# Patient Record
Sex: FEMALE | Race: BLACK OR AFRICAN AMERICAN | NOT HISPANIC OR LATINO | Employment: STUDENT | URBAN - METROPOLITAN AREA
[De-identification: names, ages, dates, MRNs, and addresses within clinical notes are randomized per-mention and may not be internally consistent; named-entity substitution may affect disease eponyms.]

---

## 2017-11-16 ENCOUNTER — HOSPITAL ENCOUNTER (EMERGENCY)
Facility: HOSPITAL | Age: 22
Discharge: HOME | End: 2017-11-16
Attending: EMERGENCY MEDICINE
Payer: MEDICAID

## 2017-11-16 ENCOUNTER — TELEPHONE (OUTPATIENT)
Dept: OBSTETRICS AND GYNECOLOGY | Facility: CLINIC | Age: 22
End: 2017-11-16

## 2017-11-16 VITALS
HEART RATE: 76 BPM | RESPIRATION RATE: 16 BRPM | SYSTOLIC BLOOD PRESSURE: 118 MMHG | TEMPERATURE: 98 F | OXYGEN SATURATION: 100 % | DIASTOLIC BLOOD PRESSURE: 76 MMHG

## 2017-11-16 DIAGNOSIS — N93.9 VAGINAL BLEEDING: ICD-10-CM

## 2017-11-16 DIAGNOSIS — O20.0 THREATENED MISCARRIAGE: Primary | ICD-10-CM

## 2017-11-16 LAB
ABO + RH BLD: NORMAL
B-HCG UR QL: POSITIVE
BACTERIA #/AREA URNS HPF: ABNORMAL /HPF
BACTERIA GENITAL QL WET PREP: ABNORMAL
BASOPHILS # BLD AUTO: 0.02 K/UL
BASOPHILS NFR BLD: 0.4 %
BILIRUB UR QL STRIP: NEGATIVE
BLD GP AB SCN CELLS X3 SERPL QL: NORMAL
CLARITY UR: ABNORMAL
CLUE CELLS VAG QL WET PREP: ABNORMAL
COLOR UR: YELLOW
DIFFERENTIAL METHOD: ABNORMAL
EOSINOPHIL # BLD AUTO: 0.4 K/UL
EOSINOPHIL NFR BLD: 7.3 %
ERYTHROCYTE [DISTWIDTH] IN BLOOD BY AUTOMATED COUNT: 13 %
FILAMENT FUNGI VAG WET PREP-#/AREA: ABNORMAL
GLUCOSE UR QL STRIP: NEGATIVE
HCG INTACT+B SERPL-ACNC: NORMAL MIU/ML
HCT VFR BLD AUTO: 44.7 %
HGB BLD-MCNC: 16.1 G/DL
HGB UR QL STRIP: ABNORMAL
KETONES UR QL STRIP: NEGATIVE
LEUKOCYTE ESTERASE UR QL STRIP: ABNORMAL
LYMPHOCYTES # BLD AUTO: 2.3 K/UL
LYMPHOCYTES NFR BLD: 46.5 %
MCH RBC QN AUTO: 31.6 PG
MCHC RBC AUTO-ENTMCNC: 36 G/DL
MCV RBC AUTO: 88 FL
MICROSCOPIC COMMENT: ABNORMAL
MONOCYTES # BLD AUTO: 0.3 K/UL
MONOCYTES NFR BLD: 6.1 %
NEUTROPHILS # BLD AUTO: 2 K/UL
NEUTROPHILS NFR BLD: 39.7 %
NITRITE UR QL STRIP: NEGATIVE
PH UR STRIP: 6 [PH] (ref 5–8)
PLATELET # BLD AUTO: 184 K/UL
PMV BLD AUTO: 10.2 FL
PROT UR QL STRIP: NEGATIVE
RBC # BLD AUTO: 5.09 M/UL
RBC #/AREA URNS HPF: 2 /HPF (ref 0–4)
SP GR UR STRIP: 1 (ref 1–1.03)
SPECIMEN SOURCE: ABNORMAL
SQUAMOUS #/AREA URNS HPF: 5 /HPF
T VAGINALIS GENITAL QL WET PREP: ABNORMAL
URN SPEC COLLECT METH UR: ABNORMAL
UROBILINOGEN UR STRIP-ACNC: NEGATIVE EU/DL
WBC # BLD AUTO: 4.93 K/UL
WBC #/AREA URNS HPF: 4 /HPF (ref 0–5)
WBC #/AREA VAG WET PREP: ABNORMAL
YEAST GENITAL QL WET PREP: ABNORMAL

## 2017-11-16 PROCEDURE — 86850 RBC ANTIBODY SCREEN: CPT

## 2017-11-16 PROCEDURE — 85025 COMPLETE CBC W/AUTO DIFF WBC: CPT

## 2017-11-16 PROCEDURE — 87210 SMEAR WET MOUNT SALINE/INK: CPT

## 2017-11-16 PROCEDURE — 86900 BLOOD TYPING SEROLOGIC ABO: CPT

## 2017-11-16 PROCEDURE — 81000 URINALYSIS NONAUTO W/SCOPE: CPT

## 2017-11-16 PROCEDURE — 99284 EMERGENCY DEPT VISIT MOD MDM: CPT

## 2017-11-16 PROCEDURE — 81025 URINE PREGNANCY TEST: CPT

## 2017-11-16 PROCEDURE — 84702 CHORIONIC GONADOTROPIN TEST: CPT

## 2017-11-16 RX ORDER — ALBUTEROL SULFATE 0.63 MG/3ML
0.63 SOLUTION RESPIRATORY (INHALATION) EVERY 6 HOURS PRN
COMMUNITY

## 2017-11-16 NOTE — ED PROVIDER NOTES
Encounter Date: 11/16/2017       History     Chief Complaint   Patient presents with    Vaginal Bleeding     vaginal bleeding and cramping.  pt 10 weeks pregnant     22 year old female with complaint of vaginal bleeding X 2 days.  Reports mild cramping.  No pain radiation.  Pt is 10 weeks pregnant per u/s at [facility] weeks ago. Pt has transferred care to [facility] but has yet to establish initial visit.   No weakness or dizziness.            Review of patient's allergies indicates:   Allergen Reactions    Latex, natural rubber      Past Medical History:   Diagnosis Date    Asthma      History reviewed. No pertinent surgical history.  History reviewed. No pertinent family history.  Social History   Substance Use Topics    Smoking status: Never Smoker    Smokeless tobacco: Never Used    Alcohol use Not on file     Review of Systems   Constitutional: Negative for fever.   HENT: Negative for sore throat.    Respiratory: Negative for shortness of breath.    Cardiovascular: Negative for chest pain.   Gastrointestinal: Negative for nausea.   Genitourinary: Positive for vaginal bleeding. Negative for dysuria.   Musculoskeletal: Negative for back pain.   Skin: Negative for rash.   Neurological: Negative for weakness.   Hematological: Does not bruise/bleed easily.       Physical Exam     Initial Vitals [11/16/17 0958]   BP Pulse Resp Temp SpO2   123/77 83 18 98.1 °F (36.7 °C) 99 %      MAP       92.33         Physical Exam    Nursing note and vitals reviewed.  Constitutional: She appears well-developed and well-nourished.   HENT:   Head: Normocephalic and atraumatic.   Eyes: Conjunctivae and EOM are normal. Pupils are equal, round, and reactive to light.   Neck: Normal range of motion. Neck supple.   Cardiovascular: Normal rate, regular rhythm, normal heart sounds and intact distal pulses.   Pulmonary/Chest: Breath sounds normal.   Abdominal: Soft. There is no tenderness. There is no rebound and no guarding.    Genitourinary:   Genitourinary Comments: Mild amount of vaginal bleeding, cervical os closed,no CMT or adnexal tenderness   Musculoskeletal: Normal range of motion.   Neurological: She is alert and oriented to person, place, and time. She has normal strength and normal reflexes.   Skin: Skin is warm and dry.   Psychiatric: She has a normal mood and affect. Her behavior is normal. Thought content normal.         ED Course   Procedures  Labs Reviewed   CBC W/ AUTO DIFFERENTIAL - Abnormal; Notable for the following:        Result Value    Hemoglobin 16.1 (*)     MCH 31.6 (*)     All other components within normal limits   URINALYSIS - Abnormal; Notable for the following:     Appearance, UA Hazy (*)     Occult Blood UA 3+ (*)     Leukocytes, UA Trace (*)     All other components within normal limits   VAGINAL SCREEN - Abnormal; Notable for the following:     WBC - Vaginal Screen Occasional (*)     Bacteria - Vaginal Screen Moderate (*)     All other components within normal limits   URINALYSIS MICROSCOPIC - Abnormal; Notable for the following:     Bacteria, UA Few (*)     All other components within normal limits   PREGNANCY TEST, URINE RAPID   HCG, QUANTITATIVE, PREGNANCY   TYPE & SCREEN        10:25 AM  Bedside u/s performed, IUP noted but no fetal heart rate activity detected     Medical Decision Makin:20 PM  Suspect fetal demise but will confirm with follow up HCG with OB, discussed results with pt, pt will return for worsening bleeding, weakness or worsening pain     Labs Reviewed   CBC W/ AUTO DIFFERENTIAL - Abnormal; Notable for the following:        Result Value    Hemoglobin 16.1 (*)     MCH 31.6 (*)     All other components within normal limits   URINALYSIS - Abnormal; Notable for the following:     Appearance, UA Hazy (*)     Occult Blood UA 3+ (*)     Leukocytes, UA Trace (*)     All other components within normal limits   VAGINAL SCREEN - Abnormal; Notable for the following:     WBC - Vaginal  Screen Occasional (*)     Bacteria - Vaginal Screen Moderate (*)     All other components within normal limits   URINALYSIS MICROSCOPIC - Abnormal; Notable for the following:     Bacteria, UA Few (*)     All other components within normal limits   PREGNANCY TEST, URINE RAPID   HCG, QUANTITATIVE, PREGNANCY   TYPE & SCREEN     Imaging Results          US OB Less Than 14 Wks First Gestation (Final result)     Abnormal  Result time 11/16/17 12:02:26    Final result by Akash Dunne MD (11/16/17 12:02:26)                 Impression:        Intrauterine pregnancy without fetal heart rate or yolk sac which is most consistent with fetal demise. Correlate with beta-hCG and followup ultrasound as clinically warranted.      Electronically signed by: AKASH DUNNE MD  Date:     11/16/17  Time:    12:02              Narrative:    History: Spotting    Comparison: None.    Findings: The patient's LMP is not known).    The uterus demonstrates a gestational sac and a fetus measuring 2 cm (CRL).  This is consistent with a 8 week and 4 day fetus. No yolk sac or fetal heart rate is identified.       Right ovary measures 3.6 x 2.9 x 2.9 cm and is unremarkable.  Left ovary measures 3.1 x 2.3 x 2.3 cm and is unremarkable. Flow is seen bilaterally. Trace left adnexal fluid is seen.                                             ED Course      Clinical Impression:   The primary encounter diagnosis was Threatened miscarriage. A diagnosis of Vaginal bleeding was also pertinent to this visit.                           Lawrence Goel NP  11/16/17 1222       Lawrence Goel NP  11/16/17 1222

## 2017-11-16 NOTE — TELEPHONE ENCOUNTER
Patient manisha to [facility] ED on yesterday, ultrasound showed fetal demise.  She wants to know what should do from here.  Please advise.

## 2017-11-17 NOTE — TELEPHONE ENCOUNTER
Attempted to call patient with no answer, left detailed message to call office to schedule an appointment for evaluation.

## 2018-12-04 ENCOUNTER — INITIAL PRENATAL (OUTPATIENT)
Dept: OBSTETRICS AND GYNECOLOGY | Facility: CLINIC | Age: 23
End: 2018-12-04
Payer: MEDICAID

## 2018-12-04 ENCOUNTER — PROCEDURE VISIT (OUTPATIENT)
Dept: OBSTETRICS AND GYNECOLOGY | Facility: CLINIC | Age: 23
End: 2018-12-04
Payer: MEDICAID

## 2018-12-04 ENCOUNTER — LAB VISIT (OUTPATIENT)
Dept: LAB | Facility: HOSPITAL | Age: 23
End: 2018-12-04
Attending: MIDWIFE
Payer: MEDICAID

## 2018-12-04 VITALS — WEIGHT: 123.25 LBS | DIASTOLIC BLOOD PRESSURE: 60 MMHG | SYSTOLIC BLOOD PRESSURE: 102 MMHG

## 2018-12-04 DIAGNOSIS — Z98.891 PREVIOUS CESAREAN SECTION: ICD-10-CM

## 2018-12-04 DIAGNOSIS — R12 HEARTBURN DURING PREGNANCY IN SECOND TRIMESTER: ICD-10-CM

## 2018-12-04 DIAGNOSIS — O26.892 HEARTBURN DURING PREGNANCY IN SECOND TRIMESTER: ICD-10-CM

## 2018-12-04 DIAGNOSIS — O09.32 LATE PRENATAL CARE AFFECTING PREGNANCY IN SECOND TRIMESTER: ICD-10-CM

## 2018-12-04 DIAGNOSIS — O09.32 LATE PRENATAL CARE AFFECTING PREGNANCY IN SECOND TRIMESTER: Primary | ICD-10-CM

## 2018-12-04 LAB
ABO + RH BLD: NORMAL
BASOPHILS # BLD AUTO: 0.02 K/UL
BASOPHILS NFR BLD: 0.3 %
BLD GP AB SCN CELLS X3 SERPL QL: NORMAL
DIFFERENTIAL METHOD: ABNORMAL
EOSINOPHIL # BLD AUTO: 0.2 K/UL
EOSINOPHIL NFR BLD: 3.4 %
ERYTHROCYTE [DISTWIDTH] IN BLOOD BY AUTOMATED COUNT: 13.5 %
HCT VFR BLD AUTO: 35.3 %
HGB BLD-MCNC: 11.9 G/DL
IMM GRANULOCYTES # BLD AUTO: 0.02 K/UL
IMM GRANULOCYTES NFR BLD AUTO: 0.3 %
LYMPHOCYTES # BLD AUTO: 2.1 K/UL
LYMPHOCYTES NFR BLD: 31.6 %
MCH RBC QN AUTO: 31.4 PG
MCHC RBC AUTO-ENTMCNC: 33.7 G/DL
MCV RBC AUTO: 93 FL
MONOCYTES # BLD AUTO: 0.4 K/UL
MONOCYTES NFR BLD: 6.3 %
NEUTROPHILS # BLD AUTO: 3.8 K/UL
NEUTROPHILS NFR BLD: 58.1 %
NRBC BLD-RTO: 0 /100 WBC
PLATELET # BLD AUTO: 181 K/UL
PMV BLD AUTO: 11.6 FL
RBC # BLD AUTO: 3.79 M/UL
WBC # BLD AUTO: 6.55 K/UL

## 2018-12-04 PROCEDURE — 86592 SYPHILIS TEST NON-TREP QUAL: CPT

## 2018-12-04 PROCEDURE — 86703 HIV-1/HIV-2 1 RESULT ANTBDY: CPT

## 2018-12-04 PROCEDURE — 85025 COMPLETE CBC W/AUTO DIFF WBC: CPT

## 2018-12-04 PROCEDURE — 86850 RBC ANTIBODY SCREEN: CPT

## 2018-12-04 PROCEDURE — 87491 CHLMYD TRACH DNA AMP PROBE: CPT

## 2018-12-04 PROCEDURE — 99213 OFFICE O/P EST LOW 20 MIN: CPT | Mod: PBBFAC,TH,25 | Performed by: MIDWIFE

## 2018-12-04 PROCEDURE — 87340 HEPATITIS B SURFACE AG IA: CPT

## 2018-12-04 PROCEDURE — 86762 RUBELLA ANTIBODY: CPT

## 2018-12-04 PROCEDURE — 76805 OB US >/= 14 WKS SNGL FETUS: CPT | Mod: PBBFAC | Performed by: OBSTETRICS & GYNECOLOGY

## 2018-12-04 PROCEDURE — 99203 OFFICE O/P NEW LOW 30 MIN: CPT | Mod: TH,S$PBB,, | Performed by: MIDWIFE

## 2018-12-04 PROCEDURE — 99999 PR PBB SHADOW E&M-EST. PATIENT-LVL III: CPT | Mod: PBBFAC,,, | Performed by: MIDWIFE

## 2018-12-04 PROCEDURE — 76805 OB US >/= 14 WKS SNGL FETUS: CPT | Mod: 26,S$PBB,, | Performed by: OBSTETRICS & GYNECOLOGY

## 2018-12-04 PROCEDURE — 87086 URINE CULTURE/COLONY COUNT: CPT

## 2018-12-04 PROCEDURE — 83021 HEMOGLOBIN CHROMOTOGRAPHY: CPT

## 2018-12-04 PROCEDURE — 36415 COLL VENOUS BLD VENIPUNCTURE: CPT

## 2018-12-04 RX ORDER — MONTELUKAST SODIUM 10 MG/1
10 TABLET ORAL
Status: ON HOLD | COMMUNITY
Start: 2018-10-18 | End: 2019-03-20 | Stop reason: HOSPADM

## 2018-12-04 RX ORDER — ACETAMINOPHEN 325 MG/1
650 TABLET ORAL EVERY 6 HOURS PRN
Status: ON HOLD | COMMUNITY
End: 2019-03-20 | Stop reason: HOSPADM

## 2018-12-04 RX ORDER — PANTOPRAZOLE SODIUM 20 MG/1
20 TABLET, DELAYED RELEASE ORAL DAILY
Qty: 30 TABLET | Refills: 1 | Status: ON HOLD | OUTPATIENT
Start: 2018-12-04 | End: 2019-03-20 | Stop reason: HOSPADM

## 2018-12-04 NOTE — PROGRESS NOTES
New OB, presents for her first OB visit, states her LMP was in July, has had 2 previous  sections, the first one was for fetal distress during pushing and the second was a repeat, pt desires a PASCUAL, will get records from [facility], FH 20 cm, pt states she has + FM, prenatal labs today, pt states she is having some nausea proceeded by indigestion, rx to pharmacy, rtc 2 wks

## 2018-12-05 LAB
C TRACH DNA SPEC QL NAA+PROBE: NOT DETECTED
HBV SURFACE AG SERPL QL IA: NEGATIVE
HGB A2 MFR BLD HPLC: 3.4 %
HGB FRACT BLD ELPH-IMP: ABNORMAL
HGB FRACT BLD ELPH-IMP: NORMAL
HIV 1+2 AB+HIV1 P24 AG SERPL QL IA: NEGATIVE
N GONORRHOEA DNA SPEC QL NAA+PROBE: NOT DETECTED
RPR SER QL: NORMAL
RUBV IGG SER-ACNC: 13.5 IU/ML
RUBV IGG SER-IMP: REACTIVE

## 2018-12-06 LAB
BACTERIA UR CULT: NORMAL
BACTERIA UR CULT: NORMAL

## 2018-12-20 ENCOUNTER — ROUTINE PRENATAL (OUTPATIENT)
Dept: OBSTETRICS AND GYNECOLOGY | Facility: CLINIC | Age: 23
End: 2018-12-20
Payer: MEDICAID

## 2018-12-20 VITALS — DIASTOLIC BLOOD PRESSURE: 50 MMHG | SYSTOLIC BLOOD PRESSURE: 80 MMHG | WEIGHT: 125.88 LBS

## 2018-12-20 DIAGNOSIS — F12.10 MILD TETRAHYDROCANNABINOL (THC) ABUSE: Primary | ICD-10-CM

## 2018-12-20 DIAGNOSIS — Z3A.24 24 WEEKS GESTATION OF PREGNANCY: ICD-10-CM

## 2018-12-20 DIAGNOSIS — Z34.90 PREGNANCY, UNSPECIFIED GESTATIONAL AGE: ICD-10-CM

## 2018-12-20 LAB
AMPHET+METHAMPHET UR QL: NEGATIVE
BARBITURATES UR QL SCN>200 NG/ML: NEGATIVE
BENZODIAZ UR QL SCN>200 NG/ML: NEGATIVE
BZE UR QL SCN: NEGATIVE
CANNABINOIDS UR QL SCN: NORMAL
CREAT UR-MCNC: 42 MG/DL
METHADONE UR QL SCN>300 NG/ML: NEGATIVE
OPIATES UR QL SCN: NEGATIVE
PCP UR QL SCN>25 NG/ML: NEGATIVE
TOXICOLOGY INFORMATION: NORMAL

## 2018-12-20 PROCEDURE — 99999 PR PBB SHADOW E&M-EST. PATIENT-LVL II: CPT | Mod: PBBFAC,,, | Performed by: MIDWIFE

## 2018-12-20 PROCEDURE — 99212 OFFICE O/P EST SF 10 MIN: CPT | Mod: PBBFAC,TH | Performed by: MIDWIFE

## 2018-12-20 PROCEDURE — 80307 DRUG TEST PRSMV CHEM ANLYZR: CPT

## 2018-12-20 PROCEDURE — 99212 OFFICE O/P EST SF 10 MIN: CPT | Mod: TH,S$PBB,, | Performed by: MIDWIFE

## 2018-12-20 NOTE — PROGRESS NOTES
Doing well overall. No complaints.  Reviewed PTL s/s.  28 week glucose screen at [place].  UDS today.  RTC in 4 weeks.    FABY Sawant

## 2018-12-21 PROBLEM — Z3A.24 24 WEEKS GESTATION OF PREGNANCY: Status: RESOLVED | Noted: 2018-12-20 | Resolved: 2018-12-21

## 2018-12-21 PROBLEM — F12.10 MILD TETRAHYDROCANNABINOL (THC) ABUSE: Status: ACTIVE | Noted: 2018-12-21

## 2019-01-04 ENCOUNTER — TELEPHONE (OUTPATIENT)
Dept: OBSTETRICS AND GYNECOLOGY | Facility: CLINIC | Age: 24
End: 2019-01-04

## 2019-01-04 NOTE — TELEPHONE ENCOUNTER
----- Message from Pilar Youngblood sent at 1/4/2019  4:50 PM CST -----  Contact: self [phone]  States that she is calling to see what she can take for nasal congestion. Please call back at [phone]//thank you acc

## 2019-01-04 NOTE — TELEPHONE ENCOUNTER
Spoke to patient and let her know that for congestion she can use Ocean Nasal Spray, a saline nasal spray, or sudafed sparingly. Informed patient not to use antihistamines because they may make the congestion worse. You can also try a humidifier. Let patient know if none of these work to let us know. Patient verbalized understanding.

## 2019-01-07 ENCOUNTER — TELEPHONE (OUTPATIENT)
Dept: OBSTETRICS AND GYNECOLOGY | Facility: CLINIC | Age: 24
End: 2019-01-07

## 2019-01-07 NOTE — TELEPHONE ENCOUNTER
----- Message from Juan Robbins sent at 1/7/2019  1:38 PM CST -----  Contact: pt   Pt would like cb to discuss getting script for anxiety. pls return call.           ..[phone] (home)

## 2019-01-07 NOTE — TELEPHONE ENCOUNTER
"Patient stated she is pacing and not able to function around others and her other children.  Patient denies any thoughts of harming herself or her children, just stated "I want to isolate myself in my room and I can do that because I have other children".  "I can't function to pay bills, go grocery shopping, etc.".  Appointment scheduled tomorrow with [facility].  Patient verbalized understanding.  "

## 2019-01-23 ENCOUNTER — LAB VISIT (OUTPATIENT)
Dept: LAB | Facility: HOSPITAL | Age: 24
End: 2019-01-23
Payer: MEDICAID

## 2019-01-23 ENCOUNTER — ROUTINE PRENATAL (OUTPATIENT)
Dept: OBSTETRICS AND GYNECOLOGY | Facility: CLINIC | Age: 24
End: 2019-01-23
Payer: MEDICAID

## 2019-01-23 VITALS
SYSTOLIC BLOOD PRESSURE: 98 MMHG | WEIGHT: 131.38 LBS | HEIGHT: 63 IN | DIASTOLIC BLOOD PRESSURE: 60 MMHG | BODY MASS INDEX: 23.28 KG/M2

## 2019-01-23 DIAGNOSIS — Z34.90 PREGNANCY, UNSPECIFIED GESTATIONAL AGE: ICD-10-CM

## 2019-01-23 DIAGNOSIS — Z98.891 PREVIOUS CESAREAN SECTION: Primary | ICD-10-CM

## 2019-01-23 LAB — GLUCOSE SERPL-MCNC: 98 MG/DL

## 2019-01-23 PROCEDURE — 99213 PR OFFICE/OUTPT VISIT, EST, LEVL III, 20-29 MIN: ICD-10-PCS | Mod: TH,S$PBB,, | Performed by: ADVANCED PRACTICE MIDWIFE

## 2019-01-23 PROCEDURE — 99999 PR PBB SHADOW E&M-EST. PATIENT-LVL III: CPT | Mod: PBBFAC,,, | Performed by: ADVANCED PRACTICE MIDWIFE

## 2019-01-23 PROCEDURE — 99213 OFFICE O/P EST LOW 20 MIN: CPT | Mod: PBBFAC | Performed by: ADVANCED PRACTICE MIDWIFE

## 2019-01-23 PROCEDURE — 82950 GLUCOSE TEST: CPT

## 2019-01-23 PROCEDURE — 99999 PR PBB SHADOW E&M-EST. PATIENT-LVL III: ICD-10-PCS | Mod: PBBFAC,,, | Performed by: ADVANCED PRACTICE MIDWIFE

## 2019-01-23 PROCEDURE — 36415 COLL VENOUS BLD VENIPUNCTURE: CPT

## 2019-01-23 PROCEDURE — 99213 OFFICE O/P EST LOW 20 MIN: CPT | Mod: TH,S$PBB,, | Performed by: ADVANCED PRACTICE MIDWIFE

## 2019-01-23 RX ORDER — FLUTICASONE PROPIONATE 50 MCG
2 SPRAY, SUSPENSION (ML) NASAL
COMMUNITY
Start: 2019-01-06 | End: 2019-11-25

## 2019-01-23 RX ORDER — ALBUTEROL SULFATE 0.63 MG/3ML
0.63 SOLUTION RESPIRATORY (INHALATION) EVERY 6 HOURS PRN
COMMUNITY
End: 2019-01-23 | Stop reason: SDUPTHER

## 2019-01-23 RX ORDER — HYDROXYZINE PAMOATE 25 MG/1
25 CAPSULE ORAL EVERY 6 HOURS PRN
Qty: 30 CAPSULE | Refills: 2 | Status: ON HOLD | OUTPATIENT
Start: 2019-01-23 | End: 2019-03-20 | Stop reason: HOSPADM

## 2019-01-23 NOTE — PROGRESS NOTES
"C/o pelvic pain, more in RLQ, exp round ligament pain, scar tissue, comfort measures. SOB with exertion and talking while walking, pt states this is "common" for her, + asthma, using inhaler. Exp if s/s worsen or dizziness to ED. Reviewed hx with pt, desires TOLAC, op note in media, #1 c/s was done for arrest of labor, second stage, failed vac ext, pt states she was exhausted, did not push well. #2 pt was told baby larger than first, odds against vaginal birth so went with repeat c/s. Exp r/b/a, safety measures in place. Restless legs at night- rec magnesium supplement. Was depressed and overwhelmed a few weeks ago, symptoms have improved, started a job at [employer] last week,  supportive. rx vistaril for sleep aid and anxious feelings. tdap handout provided and exp, will do next OV. Labs today. Pt desires BTL exp permanency, r/b/a, consent signed. al  "

## 2019-02-06 ENCOUNTER — TELEPHONE (OUTPATIENT)
Dept: OBSTETRICS AND GYNECOLOGY | Facility: CLINIC | Age: 24
End: 2019-02-06

## 2019-02-06 ENCOUNTER — ROUTINE PRENATAL (OUTPATIENT)
Dept: OBSTETRICS AND GYNECOLOGY | Facility: CLINIC | Age: 24
End: 2019-02-06
Payer: MEDICAID

## 2019-02-06 VITALS — DIASTOLIC BLOOD PRESSURE: 60 MMHG | WEIGHT: 134.5 LBS | BODY MASS INDEX: 23.82 KG/M2 | SYSTOLIC BLOOD PRESSURE: 102 MMHG

## 2019-02-06 DIAGNOSIS — Z34.80 ENCOUNTER FOR SUPERVISION OF NORMAL PREGNANCY IN MULTIGRAVIDA: ICD-10-CM

## 2019-02-06 DIAGNOSIS — O26.849 UTERINE SIZE DATE DISCREPANCY PREGNANCY: ICD-10-CM

## 2019-02-06 DIAGNOSIS — M54.50 ACUTE MIDLINE LOW BACK PAIN WITHOUT SCIATICA: ICD-10-CM

## 2019-02-06 DIAGNOSIS — Z98.891 PREVIOUS CESAREAN SECTION: Primary | ICD-10-CM

## 2019-02-06 PROCEDURE — 99213 OFFICE O/P EST LOW 20 MIN: CPT | Mod: TH,S$PBB,, | Performed by: ADVANCED PRACTICE MIDWIFE

## 2019-02-06 PROCEDURE — 99999 PR PBB SHADOW E&M-EST. PATIENT-LVL II: ICD-10-PCS | Mod: PBBFAC,,, | Performed by: ADVANCED PRACTICE MIDWIFE

## 2019-02-06 PROCEDURE — 90471 IMMUNIZATION ADMIN: CPT | Mod: PBBFAC

## 2019-02-06 PROCEDURE — 99212 OFFICE O/P EST SF 10 MIN: CPT | Mod: PBBFAC,TH | Performed by: ADVANCED PRACTICE MIDWIFE

## 2019-02-06 PROCEDURE — 99999 PR PBB SHADOW E&M-EST. PATIENT-LVL II: CPT | Mod: PBBFAC,,, | Performed by: ADVANCED PRACTICE MIDWIFE

## 2019-02-06 PROCEDURE — 99213 PR OFFICE/OUTPT VISIT, EST, LEVL III, 20-29 MIN: ICD-10-PCS | Mod: TH,S$PBB,, | Performed by: ADVANCED PRACTICE MIDWIFE

## 2019-02-06 RX ORDER — PROMETHAZINE HYDROCHLORIDE 6.25 MG/5ML
12.5 SYRUP ORAL 4 TIMES DAILY PRN
COMMUNITY
Start: 2019-02-01 | End: 2019-02-08

## 2019-02-06 RX ORDER — METHOCARBAMOL 500 MG/1
500 TABLET, FILM COATED ORAL 3 TIMES DAILY
Qty: 30 TABLET | Refills: 0 | Status: SHIPPED | OUTPATIENT
Start: 2019-02-06 | End: 2019-02-16

## 2019-02-06 NOTE — PROGRESS NOTES
Tdap given IM to left deltoid.  Pt tolerated well.  Pt advised to wait 10-15 minutes for s/s of medication reaction.  Appt made for next visit on 2/19/19 at 1pm at [place] location, per pt request.  Pt voiced understanding.  JONATHAN VIVAR

## 2019-02-06 NOTE — PROGRESS NOTES
Reports good FM.  Here today because had to leave work due to back pain.  States pain lower middle back.  Denies contractions or loss of fluid, or UTI S/S.  Advised she is very small and that lower back pain common in third trimester.  Advised to purcahse a maternity belt, and to alternate cold packs with heating pad.   Warm soaks PRN.   Rx for Robaxin sent to pharmacy.  Declines PT @ present.  S<D US with RTC 2 weeks to assess EFW.  TDAP today.  FKC's and S/S PTL reviewed

## 2019-02-06 NOTE — TELEPHONE ENCOUNTER
Having pain x 930pm last night, want to be seen,  appt made for today at 940 at [facility],  Patient said that she called medical to Cx her daughter mediaid insurance but the Cx her's,  She said they did have another insurance pending, she will let us know tf

## 2019-02-06 NOTE — LETTER
February 6, 2019      [facility] - [facility]  [address] [facility] [facility] [address]  Phone: [phone]  Fax: [phone]       Patient: Nena Nascimento   YOB: 1995  Date of Visit: 02/06/2019    To Whom It May Concern:    Arnulfo Nascimento  was at [facility] on 02/06/2019. She may return to work/school on 2/7/19 with lifting restrictions of no more than 20#.  If you have any questions or concerns, or if I can be of further assistance, please do not hesitate to contact me.    Sincerely,        Siomara Calvo CNM

## 2019-02-19 ENCOUNTER — PROCEDURE VISIT (OUTPATIENT)
Dept: OBSTETRICS AND GYNECOLOGY | Facility: CLINIC | Age: 24
End: 2019-02-19
Payer: MEDICAID

## 2019-02-19 ENCOUNTER — ROUTINE PRENATAL (OUTPATIENT)
Dept: OBSTETRICS AND GYNECOLOGY | Facility: CLINIC | Age: 24
End: 2019-02-19
Payer: MEDICAID

## 2019-02-19 VITALS
BODY MASS INDEX: 24.41 KG/M2 | WEIGHT: 137.81 LBS | SYSTOLIC BLOOD PRESSURE: 100 MMHG | DIASTOLIC BLOOD PRESSURE: 60 MMHG

## 2019-02-19 DIAGNOSIS — Z98.891 PREVIOUS CESAREAN SECTION: Primary | ICD-10-CM

## 2019-02-19 DIAGNOSIS — O26.849 UTERINE SIZE DATE DISCREPANCY PREGNANCY: ICD-10-CM

## 2019-02-19 DIAGNOSIS — Z34.80 ENCOUNTER FOR SUPERVISION OF NORMAL PREGNANCY IN MULTIGRAVIDA: ICD-10-CM

## 2019-02-19 PROCEDURE — 99213 OFFICE O/P EST LOW 20 MIN: CPT | Mod: PBBFAC,TH | Performed by: ADVANCED PRACTICE MIDWIFE

## 2019-02-19 PROCEDURE — 99999 PR PBB SHADOW E&M-EST. PATIENT-LVL III: ICD-10-PCS | Mod: PBBFAC,,, | Performed by: ADVANCED PRACTICE MIDWIFE

## 2019-02-19 PROCEDURE — 76819 PR US, OB, FETAL BIOPHYSICAL, W/O NST: ICD-10-PCS | Mod: 26,S$PBB,, | Performed by: OBSTETRICS & GYNECOLOGY

## 2019-02-19 PROCEDURE — 76816 OB US FOLLOW-UP PER FETUS: CPT | Mod: PBBFAC | Performed by: OBSTETRICS & GYNECOLOGY

## 2019-02-19 PROCEDURE — 99213 PR OFFICE/OUTPT VISIT, EST, LEVL III, 20-29 MIN: ICD-10-PCS | Mod: TH,S$PBB,, | Performed by: ADVANCED PRACTICE MIDWIFE

## 2019-02-19 PROCEDURE — 99213 OFFICE O/P EST LOW 20 MIN: CPT | Mod: TH,S$PBB,, | Performed by: ADVANCED PRACTICE MIDWIFE

## 2019-02-19 PROCEDURE — 76816 PR  US,PREGNANT UTERUS,F/U,TRANSABD APP: ICD-10-PCS | Mod: 26,S$PBB,, | Performed by: OBSTETRICS & GYNECOLOGY

## 2019-02-19 PROCEDURE — 76819 FETAL BIOPHYS PROFIL W/O NST: CPT | Mod: PBBFAC | Performed by: OBSTETRICS & GYNECOLOGY

## 2019-02-19 PROCEDURE — 99999 PR PBB SHADOW E&M-EST. PATIENT-LVL III: CPT | Mod: PBBFAC,,, | Performed by: ADVANCED PRACTICE MIDWIFE

## 2019-02-19 PROCEDURE — 76819 FETAL BIOPHYS PROFIL W/O NST: CPT | Mod: 26,S$PBB,, | Performed by: OBSTETRICS & GYNECOLOGY

## 2019-02-19 PROCEDURE — 76816 OB US FOLLOW-UP PER FETUS: CPT | Mod: 26,S$PBB,, | Performed by: OBSTETRICS & GYNECOLOGY

## 2019-02-19 NOTE — PROGRESS NOTES
Doing well, US today EFW 4#8oz, 27%. Denies PTL s/s, states good FM. Questions concerning breastfeeding discussed.   Quiet hour discussed  Coffective counseling sheet Learn Your Baby discussed with mother. Instructed regarding feeding cues and methods to calm baby. Encouraged mother to download Coffective mobile carla if she has not already done so.  Mother verbalized understanding.   Coffective counseling sheet Keep Baby Close discussed with mother. Reinforced rooming in practices, continued skin to skin, and quiet hours as requested by mother.  Encouraged mother to download Coffective mobile carla if she has not already done so. Mother verbalizes understanding. al

## 2019-02-26 ENCOUNTER — TELEPHONE (OUTPATIENT)
Dept: OBSTETRICS AND GYNECOLOGY | Facility: CLINIC | Age: 24
End: 2019-02-26

## 2019-02-26 NOTE — TELEPHONE ENCOUNTER
I only saw her on the last visit and there were no complaints. During pregnancy we recommend them to continue working as long as possible if health permits. She can discuss this with her provider at her next appointment. Pilar

## 2019-02-26 NOTE — TELEPHONE ENCOUNTER
Patient states that her work needs a letter saying when she is to stop working for the duration of the pregnancy. Patient stated that she is to stop at 36 weeks.  Patient states that the reasoning is that her job requires standing, and that it is hard for her stand this far along in her pregnancy.  Please advise on if patient is to stop work.

## 2019-02-26 NOTE — TELEPHONE ENCOUNTER
Called and informed patient that SCOTTY Quezada stated that patient has to wait until next appt which is next week 3/8/2019 to speak about the letter that the patient is requesting for work leave. Patient stated that standing for long period of time is hard for her in this stage of her pregnancy.  SCOTTY Quezada stated that patient did not mention any issues with work in regards to her not being able to stand on her feet for long periods of time.  Relayed this information to the patient, and she verbalized understanding.

## 2019-02-26 NOTE — TELEPHONE ENCOUNTER
----- Message from Annemarie Meza sent at 2/26/2019  9:39 AM CST -----  Contact: Patient  Type:  Needs Medical Advice    Who Called: Nena  Symptoms (please be specific): n/a  How long has patient had these symptoms:  n/a  Pharmacy name and phone #:  n/a  Would the patient rather a call back or a response via [facility]?  Call back  Best Call Back Number: [phone]  Additional Information: The patient needs a letter that explains when she is supposed to start working; she needs it for work right away.

## 2019-02-26 NOTE — TELEPHONE ENCOUNTER
----- Message from Norah Cerda sent at 2/26/2019 10:49 AM CST -----  Contact: pt   States she's calling rg the letter needs to be emailed. States they don't have a fax and the email address is Jose Mjuan [email or web address] and pt can be reached at [phone]//thanks/dbw

## 2019-03-08 ENCOUNTER — ROUTINE PRENATAL (OUTPATIENT)
Dept: OBSTETRICS AND GYNECOLOGY | Facility: CLINIC | Age: 24
End: 2019-03-08
Payer: MEDICAID

## 2019-03-08 VITALS
WEIGHT: 141.75 LBS | BODY MASS INDEX: 25.11 KG/M2 | SYSTOLIC BLOOD PRESSURE: 116 MMHG | DIASTOLIC BLOOD PRESSURE: 64 MMHG

## 2019-03-08 DIAGNOSIS — Z98.891 PREVIOUS CESAREAN SECTION: ICD-10-CM

## 2019-03-08 DIAGNOSIS — Z34.80 ENCOUNTER FOR SUPERVISION OF NORMAL PREGNANCY IN MULTIGRAVIDA: Primary | ICD-10-CM

## 2019-03-08 PROCEDURE — 99213 OFFICE O/P EST LOW 20 MIN: CPT | Mod: TH,S$PBB,, | Performed by: ADVANCED PRACTICE MIDWIFE

## 2019-03-08 PROCEDURE — 99213 OFFICE O/P EST LOW 20 MIN: CPT | Mod: PBBFAC,TH | Performed by: ADVANCED PRACTICE MIDWIFE

## 2019-03-08 PROCEDURE — 87081 CULTURE SCREEN ONLY: CPT

## 2019-03-08 PROCEDURE — 99999 PR PBB SHADOW E&M-EST. PATIENT-LVL III: CPT | Mod: PBBFAC,,, | Performed by: ADVANCED PRACTICE MIDWIFE

## 2019-03-08 PROCEDURE — 99213 PR OFFICE/OUTPT VISIT, EST, LEVL III, 20-29 MIN: ICD-10-PCS | Mod: TH,S$PBB,, | Performed by: ADVANCED PRACTICE MIDWIFE

## 2019-03-08 PROCEDURE — 99999 PR PBB SHADOW E&M-EST. PATIENT-LVL III: ICD-10-PCS | Mod: PBBFAC,,, | Performed by: ADVANCED PRACTICE MIDWIFE

## 2019-03-08 NOTE — PROGRESS NOTES
Doing well, no c/o. Needs note documenting she CAN work without restrictions, working at [employer], note provided. GBS collected today. Denies PTL s/s. Still desires TOLAC X 2, exp will sched with MD for final approval. UDS next OV secondary to THC use in the past and planning to breastfeed. Pt aware. al

## 2019-03-11 LAB — BACTERIA SPEC AEROBE CULT: NORMAL

## 2019-03-17 ENCOUNTER — HOSPITAL ENCOUNTER (INPATIENT)
Facility: HOSPITAL | Age: 24
LOS: 3 days | Discharge: HOME | End: 2019-03-20
Attending: OBSTETRICS & GYNECOLOGY | Admitting: OBSTETRICS & GYNECOLOGY
Payer: MEDICAID

## 2019-03-17 ENCOUNTER — ANESTHESIA EVENT (OUTPATIENT)
Dept: OBSTETRICS AND GYNECOLOGY | Facility: HOSPITAL | Age: 24
End: 2019-03-17
Payer: MEDICAID

## 2019-03-17 ENCOUNTER — ANESTHESIA (OUTPATIENT)
Dept: OBSTETRICS AND GYNECOLOGY | Facility: HOSPITAL | Age: 24
End: 2019-03-17
Payer: MEDICAID

## 2019-03-17 DIAGNOSIS — J10.1 INFLUENZA A: Primary | ICD-10-CM

## 2019-03-17 DIAGNOSIS — Z98.891 S/P CESAREAN SECTION: ICD-10-CM

## 2019-03-17 DIAGNOSIS — O42.90 AMNIOTIC FLUID LEAKING: ICD-10-CM

## 2019-03-17 PROBLEM — J45.909 ASTHMA: Status: ACTIVE | Noted: 2019-03-17

## 2019-03-17 PROBLEM — O43.193: Status: ACTIVE | Noted: 2019-03-17

## 2019-03-17 PROBLEM — O41.1230 CHORIOAMNIONITIS IN THIRD TRIMESTER: Status: ACTIVE | Noted: 2019-03-17

## 2019-03-17 LAB
ABO + RH BLD: NORMAL
AMPHET+METHAMPHET UR QL: NEGATIVE
BARBITURATES UR QL SCN>200 NG/ML: NEGATIVE
BASOPHILS # BLD AUTO: 0.01 K/UL
BASOPHILS NFR BLD: 0.1 %
BENZODIAZ UR QL SCN>200 NG/ML: NEGATIVE
BLD GP AB SCN CELLS X3 SERPL QL: NORMAL
BZE UR QL SCN: NEGATIVE
CANNABINOIDS UR QL SCN: NORMAL
CREAT UR-MCNC: 75.2 MG/DL
DIFFERENTIAL METHOD: ABNORMAL
EOSINOPHIL # BLD AUTO: 0 K/UL
EOSINOPHIL NFR BLD: 0.1 %
ERYTHROCYTE [DISTWIDTH] IN BLOOD BY AUTOMATED COUNT: 13.3 %
HCT VFR BLD AUTO: 31.6 %
HGB BLD-MCNC: 10.8 G/DL
HIV1+2 IGG SERPL QL IA.RAPID: NEGATIVE
INFLUENZA A, MOLECULAR: POSITIVE
INFLUENZA B, MOLECULAR: NEGATIVE
LYMPHOCYTES # BLD AUTO: 0.4 K/UL
LYMPHOCYTES NFR BLD: 3.6 %
MCH RBC QN AUTO: 31 PG
MCHC RBC AUTO-ENTMCNC: 34.2 G/DL
MCV RBC AUTO: 91 FL
METHADONE UR QL SCN>300 NG/ML: NEGATIVE
MONOCYTES # BLD AUTO: 0.7 K/UL
MONOCYTES NFR BLD: 6 %
NEUTROPHILS # BLD AUTO: 10.4 K/UL
NEUTROPHILS NFR BLD: 90.2 %
OPIATES UR QL SCN: NEGATIVE
PCP UR QL SCN>25 NG/ML: NEGATIVE
PLATELET # BLD AUTO: 158 K/UL
PMV BLD AUTO: 11.3 FL
RBC # BLD AUTO: 3.48 M/UL
RPR SER QL: NORMAL
SPECIMEN SOURCE: ABNORMAL
TOXICOLOGY INFORMATION: NORMAL
WBC # BLD AUTO: 11.54 K/UL

## 2019-03-17 PROCEDURE — 63600175 PHARM REV CODE 636 W HCPCS: Performed by: NURSE ANESTHETIST, CERTIFIED REGISTERED

## 2019-03-17 PROCEDURE — 62322 NJX INTERLAMINAR LMBR/SAC: CPT | Performed by: STUDENT IN AN ORGANIZED HEALTH CARE EDUCATION/TRAINING PROGRAM

## 2019-03-17 PROCEDURE — 37000009 HC ANESTHESIA EA ADD 15 MINS: Performed by: OBSTETRICS & GYNECOLOGY

## 2019-03-17 PROCEDURE — 80307 DRUG TEST PRSMV CHEM ANLYZR: CPT

## 2019-03-17 PROCEDURE — S0077 INJECTION, CLINDAMYCIN PHOSP: HCPCS | Performed by: OBSTETRICS & GYNECOLOGY

## 2019-03-17 PROCEDURE — 63600175 PHARM REV CODE 636 W HCPCS: Performed by: ADVANCED PRACTICE MIDWIFE

## 2019-03-17 PROCEDURE — 72100002 HC LABOR CARE, 1ST 8 HOURS

## 2019-03-17 PROCEDURE — 25000003 PHARM REV CODE 250: Performed by: ADVANCED PRACTICE MIDWIFE

## 2019-03-17 PROCEDURE — 72100003 HC LABOR CARE, EA. ADDL. 8 HRS

## 2019-03-17 PROCEDURE — 11000001 HC ACUTE MED/SURG PRIVATE ROOM

## 2019-03-17 PROCEDURE — 86703 HIV-1/HIV-2 1 RESULT ANTBDY: CPT

## 2019-03-17 PROCEDURE — 85025 COMPLETE CBC W/AUTO DIFF WBC: CPT

## 2019-03-17 PROCEDURE — 94640 AIRWAY INHALATION TREATMENT: CPT

## 2019-03-17 PROCEDURE — 25000003 PHARM REV CODE 250: Performed by: OBSTETRICS & GYNECOLOGY

## 2019-03-17 PROCEDURE — 59620 PR C-SEC ONLY,PREV C-SEC: ICD-10-PCS | Mod: AT,,, | Performed by: OBSTETRICS & GYNECOLOGY

## 2019-03-17 PROCEDURE — 25000242 PHARM REV CODE 250 ALT 637 W/ HCPCS: Performed by: ADVANCED PRACTICE MIDWIFE

## 2019-03-17 PROCEDURE — 36000684 HC CESAREAN SECTION, UNSCHEDULED

## 2019-03-17 PROCEDURE — 86592 SYPHILIS TEST NON-TREP QUAL: CPT

## 2019-03-17 PROCEDURE — 37000008 HC ANESTHESIA 1ST 15 MINUTES: Performed by: OBSTETRICS & GYNECOLOGY

## 2019-03-17 PROCEDURE — 63600175 PHARM REV CODE 636 W HCPCS: Performed by: OBSTETRICS & GYNECOLOGY

## 2019-03-17 PROCEDURE — 51702 INSERT TEMP BLADDER CATH: CPT

## 2019-03-17 PROCEDURE — 87502 INFLUENZA DNA AMP PROBE: CPT

## 2019-03-17 PROCEDURE — 86850 RBC ANTIBODY SCREEN: CPT

## 2019-03-17 RX ORDER — SODIUM CHLORIDE 9 MG/ML
INJECTION, SOLUTION INTRAVENOUS
Status: DISCONTINUED | OUTPATIENT
Start: 2019-03-17 | End: 2019-03-17

## 2019-03-17 RX ORDER — HYDROCODONE BITARTRATE AND ACETAMINOPHEN 10; 325 MG/1; MG/1
1 TABLET ORAL EVERY 4 HOURS PRN
Status: DISCONTINUED | OUTPATIENT
Start: 2019-03-17 | End: 2019-03-17

## 2019-03-17 RX ORDER — SODIUM CITRATE AND CITRIC ACID MONOHYDRATE 334; 500 MG/5ML; MG/5ML
30 SOLUTION ORAL
Status: DISCONTINUED | OUTPATIENT
Start: 2019-03-17 | End: 2019-03-17

## 2019-03-17 RX ORDER — MORPHINE SULFATE 1 MG/ML
INJECTION, SOLUTION EPIDURAL; INTRATHECAL; INTRAVENOUS
Status: DISCONTINUED | OUTPATIENT
Start: 2019-03-17 | End: 2019-03-17

## 2019-03-17 RX ORDER — HYDROCODONE BITARTRATE AND ACETAMINOPHEN 5; 325 MG/1; MG/1
1 TABLET ORAL EVERY 4 HOURS PRN
Status: DISCONTINUED | OUTPATIENT
Start: 2019-03-17 | End: 2019-03-17

## 2019-03-17 RX ORDER — OXYTOCIN/RINGER'S LACTATE 20/1000 ML
41.65 PLASTIC BAG, INJECTION (ML) INTRAVENOUS CONTINUOUS
Status: DISPENSED | OUTPATIENT
Start: 2019-03-17 | End: 2019-03-18

## 2019-03-17 RX ORDER — SODIUM CHLORIDE, SODIUM LACTATE, POTASSIUM CHLORIDE, CALCIUM CHLORIDE 600; 310; 30; 20 MG/100ML; MG/100ML; MG/100ML; MG/100ML
INJECTION, SOLUTION INTRAVENOUS CONTINUOUS
Status: ACTIVE | OUTPATIENT
Start: 2019-03-17 | End: 2019-03-18

## 2019-03-17 RX ORDER — ACETAMINOPHEN 500 MG
1000 TABLET ORAL
Status: DISCONTINUED | OUTPATIENT
Start: 2019-03-18 | End: 2019-03-20 | Stop reason: HOSPADM

## 2019-03-17 RX ORDER — OSELTAMIVIR PHOSPHATE 75 MG/1
75 CAPSULE ORAL 2 TIMES DAILY
Status: DISCONTINUED | OUTPATIENT
Start: 2019-03-17 | End: 2019-03-20 | Stop reason: HOSPADM

## 2019-03-17 RX ORDER — KETOROLAC TROMETHAMINE 30 MG/ML
INJECTION, SOLUTION INTRAMUSCULAR; INTRAVENOUS
Status: DISCONTINUED | OUTPATIENT
Start: 2019-03-17 | End: 2019-03-17

## 2019-03-17 RX ORDER — IBUPROFEN 600 MG/1
600 TABLET ORAL EVERY 6 HOURS
Status: DISCONTINUED | OUTPATIENT
Start: 2019-03-18 | End: 2019-03-20 | Stop reason: HOSPADM

## 2019-03-17 RX ORDER — DIPHENHYDRAMINE HCL 25 MG
25 CAPSULE ORAL EVERY 4 HOURS PRN
Status: DISCONTINUED | OUTPATIENT
Start: 2019-03-17 | End: 2019-03-20 | Stop reason: HOSPADM

## 2019-03-17 RX ORDER — ACETAMINOPHEN 500 MG
1000 TABLET ORAL EVERY 6 HOURS PRN
Status: DISCONTINUED | OUTPATIENT
Start: 2019-03-17 | End: 2019-03-17

## 2019-03-17 RX ORDER — SIMETHICONE 80 MG
1 TABLET,CHEWABLE ORAL EVERY 6 HOURS PRN
Status: DISCONTINUED | OUTPATIENT
Start: 2019-03-17 | End: 2019-03-20 | Stop reason: HOSPADM

## 2019-03-17 RX ORDER — ACETAMINOPHEN 500 MG
1000 TABLET ORAL ONCE
Status: DISCONTINUED | OUTPATIENT
Start: 2019-03-17 | End: 2019-03-17

## 2019-03-17 RX ORDER — OXYTOCIN/RINGER'S LACTATE 20/1000 ML
2 PLASTIC BAG, INJECTION (ML) INTRAVENOUS CONTINUOUS
Status: DISCONTINUED | OUTPATIENT
Start: 2019-03-17 | End: 2019-03-17

## 2019-03-17 RX ORDER — MISOPROSTOL 200 UG/1
800 TABLET ORAL
Status: DISCONTINUED | OUTPATIENT
Start: 2019-03-17 | End: 2019-03-17

## 2019-03-17 RX ORDER — HYDROCODONE BITARTRATE AND ACETAMINOPHEN 5; 325 MG/1; MG/1
1 TABLET ORAL EVERY 6 HOURS PRN
Status: DISCONTINUED | OUTPATIENT
Start: 2019-03-17 | End: 2019-03-19

## 2019-03-17 RX ORDER — ONDANSETRON 2 MG/ML
INJECTION INTRAMUSCULAR; INTRAVENOUS
Status: DISCONTINUED | OUTPATIENT
Start: 2019-03-17 | End: 2019-03-17

## 2019-03-17 RX ORDER — MISOPROSTOL 200 UG/1
600 TABLET ORAL
Status: DISCONTINUED | OUTPATIENT
Start: 2019-03-17 | End: 2019-03-20 | Stop reason: HOSPADM

## 2019-03-17 RX ORDER — CHLORHEXIDINE GLUCONATE ORAL RINSE 1.2 MG/ML
10 SOLUTION DENTAL
Status: DISCONTINUED | OUTPATIENT
Start: 2019-03-17 | End: 2019-03-17

## 2019-03-17 RX ORDER — DOCUSATE SODIUM 100 MG/1
200 CAPSULE, LIQUID FILLED ORAL 2 TIMES DAILY PRN
Status: DISCONTINUED | OUTPATIENT
Start: 2019-03-17 | End: 2019-03-20 | Stop reason: HOSPADM

## 2019-03-17 RX ORDER — HYDROMORPHONE HYDROCHLORIDE 1 MG/ML
1 INJECTION, SOLUTION INTRAMUSCULAR; INTRAVENOUS; SUBCUTANEOUS EVERY 4 HOURS PRN
Status: DISPENSED | OUTPATIENT
Start: 2019-03-17 | End: 2019-03-18

## 2019-03-17 RX ORDER — IPRATROPIUM BROMIDE AND ALBUTEROL SULFATE 2.5; .5 MG/3ML; MG/3ML
3 SOLUTION RESPIRATORY (INHALATION) EVERY 6 HOURS PRN
Status: DISCONTINUED | OUTPATIENT
Start: 2019-03-17 | End: 2019-03-17

## 2019-03-17 RX ORDER — SODIUM CHLORIDE, SODIUM LACTATE, POTASSIUM CHLORIDE, CALCIUM CHLORIDE 600; 310; 30; 20 MG/100ML; MG/100ML; MG/100ML; MG/100ML
INJECTION, SOLUTION INTRAVENOUS CONTINUOUS
Status: DISCONTINUED | OUTPATIENT
Start: 2019-03-17 | End: 2019-03-17

## 2019-03-17 RX ORDER — PANTOPRAZOLE SODIUM 40 MG/1
20 TABLET, DELAYED RELEASE ORAL DAILY
Status: DISCONTINUED | OUTPATIENT
Start: 2019-03-18 | End: 2019-03-20 | Stop reason: HOSPADM

## 2019-03-17 RX ORDER — HYDROCORTISONE 25 MG/G
CREAM TOPICAL 3 TIMES DAILY PRN
Status: DISCONTINUED | OUTPATIENT
Start: 2019-03-17 | End: 2019-03-20 | Stop reason: HOSPADM

## 2019-03-17 RX ORDER — HYDROCODONE BITARTRATE AND ACETAMINOPHEN 10; 325 MG/1; MG/1
1 TABLET ORAL EVERY 6 HOURS PRN
Status: DISCONTINUED | OUTPATIENT
Start: 2019-03-17 | End: 2019-03-19

## 2019-03-17 RX ORDER — BISACODYL 10 MG
10 SUPPOSITORY, RECTAL RECTAL ONCE AS NEEDED
Status: DISCONTINUED | OUTPATIENT
Start: 2019-03-17 | End: 2019-03-20 | Stop reason: HOSPADM

## 2019-03-17 RX ORDER — CHLORHEXIDINE GLUCONATE ORAL RINSE 1.2 MG/ML
10 SOLUTION DENTAL 2 TIMES DAILY
Status: DISCONTINUED | OUTPATIENT
Start: 2019-03-17 | End: 2019-03-20 | Stop reason: HOSPADM

## 2019-03-17 RX ORDER — OXYTOCIN/RINGER'S LACTATE 20/1000 ML
333 PLASTIC BAG, INJECTION (ML) INTRAVENOUS CONTINUOUS
Status: DISCONTINUED | OUTPATIENT
Start: 2019-03-17 | End: 2019-03-17

## 2019-03-17 RX ORDER — ADHESIVE BANDAGE
30 BANDAGE TOPICAL 2 TIMES DAILY PRN
Status: DISCONTINUED | OUTPATIENT
Start: 2019-03-18 | End: 2019-03-20 | Stop reason: HOSPADM

## 2019-03-17 RX ORDER — HYDROCODONE BITARTRATE AND ACETAMINOPHEN 10; 325 MG/1; MG/1
1 TABLET ORAL EVERY 6 HOURS PRN
Status: DISCONTINUED | OUTPATIENT
Start: 2019-03-17 | End: 2019-03-17

## 2019-03-17 RX ORDER — AMOXICILLIN 250 MG
1 CAPSULE ORAL NIGHTLY
Status: DISCONTINUED | OUTPATIENT
Start: 2019-03-17 | End: 2019-03-20 | Stop reason: HOSPADM

## 2019-03-17 RX ORDER — CLINDAMYCIN PHOSPHATE 900 MG/50ML
900 INJECTION, SOLUTION INTRAVENOUS
Status: COMPLETED | OUTPATIENT
Start: 2019-03-17 | End: 2019-03-18

## 2019-03-17 RX ORDER — CEFAZOLIN SODIUM 2 G/50ML
2 SOLUTION INTRAVENOUS
Status: DISCONTINUED | OUTPATIENT
Start: 2019-03-17 | End: 2019-03-17

## 2019-03-17 RX ORDER — ONDANSETRON 8 MG/1
8 TABLET, ORALLY DISINTEGRATING ORAL EVERY 8 HOURS PRN
Status: DISCONTINUED | OUTPATIENT
Start: 2019-03-17 | End: 2019-03-20 | Stop reason: HOSPADM

## 2019-03-17 RX ORDER — BUTORPHANOL TARTRATE 2 MG/ML
1 INJECTION INTRAMUSCULAR; INTRAVENOUS ONCE
Status: COMPLETED | OUTPATIENT
Start: 2019-03-17 | End: 2019-03-17

## 2019-03-17 RX ORDER — ONDANSETRON 8 MG/1
8 TABLET, ORALLY DISINTEGRATING ORAL EVERY 8 HOURS PRN
Status: DISCONTINUED | OUTPATIENT
Start: 2019-03-17 | End: 2019-03-17

## 2019-03-17 RX ADMIN — MORPHINE SULFATE 2 MG: 1 INJECTION, SOLUTION EPIDURAL; INTRATHECAL; INTRAVENOUS at 07:03

## 2019-03-17 RX ADMIN — SODIUM CHLORIDE, SODIUM LACTATE, POTASSIUM CHLORIDE, AND CALCIUM CHLORIDE 1000 ML: .6; .31; .03; .02 INJECTION, SOLUTION INTRAVENOUS at 06:03

## 2019-03-17 RX ADMIN — HYDROCODONE BITARTRATE AND ACETAMINOPHEN 1 TABLET: 10; 325 TABLET ORAL at 09:03

## 2019-03-17 RX ADMIN — GENTAMICIN SULFATE 320 MG: 40 INJECTION, SOLUTION INTRAMUSCULAR; INTRAVENOUS at 06:03

## 2019-03-17 RX ADMIN — CLINDAMYCIN IN 5 PERCENT DEXTROSE 900 MG: 18 INJECTION, SOLUTION INTRAVENOUS at 08:03

## 2019-03-17 RX ADMIN — Medication 2 ML: at 07:03

## 2019-03-17 RX ADMIN — Medication 600 ML: at 07:03

## 2019-03-17 RX ADMIN — IPRATROPIUM BROMIDE AND ALBUTEROL SULFATE 3 ML: .5; 3 SOLUTION RESPIRATORY (INHALATION) at 08:03

## 2019-03-17 RX ADMIN — DOCUSATE SODIUM - SENNOSIDES 1 TABLET: 50; 8.6 TABLET, FILM COATED ORAL at 11:03

## 2019-03-17 RX ADMIN — AMPICILLIN SODIUM 2 G: 2 INJECTION, POWDER, FOR SOLUTION INTRAMUSCULAR; INTRAVENOUS at 11:03

## 2019-03-17 RX ADMIN — MORPHINE SULFATE 0.2 MG: 1 INJECTION, SOLUTION EPIDURAL; INTRATHECAL; INTRAVENOUS at 06:03

## 2019-03-17 RX ADMIN — SODIUM CHLORIDE, SODIUM LACTATE, POTASSIUM CHLORIDE, AND CALCIUM CHLORIDE: .6; .31; .03; .02 INJECTION, SOLUTION INTRAVENOUS at 03:03

## 2019-03-17 RX ADMIN — AZITHROMYCIN MONOHYDRATE 500 MG: 500 INJECTION, POWDER, LYOPHILIZED, FOR SOLUTION INTRAVENOUS at 05:03

## 2019-03-17 RX ADMIN — ACETAMINOPHEN 1000 MG: 500 TABLET ORAL at 05:03

## 2019-03-17 RX ADMIN — Medication 41.65 MILLI-UNITS/MIN: at 11:03

## 2019-03-17 RX ADMIN — OSELTAMIVIR PHOSPHATE 75 MG: 75 CAPSULE ORAL at 11:03

## 2019-03-17 RX ADMIN — AMPICILLIN SODIUM 2 G: 2 INJECTION, POWDER, FOR SOLUTION INTRAMUSCULAR; INTRAVENOUS at 05:03

## 2019-03-17 RX ADMIN — BUTORPHANOL TARTRATE 1 MG: 2 INJECTION, SOLUTION INTRAMUSCULAR; INTRAVENOUS at 03:03

## 2019-03-17 RX ADMIN — ONDANSETRON 8 MG: 8 TABLET, ORALLY DISINTEGRATING ORAL at 05:03

## 2019-03-17 RX ADMIN — Medication 2 MILLI-UNITS/MIN: at 03:03

## 2019-03-17 RX ADMIN — KETOROLAC TROMETHAMINE 30 MG: 30 INJECTION, SOLUTION INTRAMUSCULAR at 07:03

## 2019-03-17 RX ADMIN — IBUPROFEN 600 MG: 600 TABLET ORAL at 11:03

## 2019-03-17 RX ADMIN — IPRATROPIUM BROMIDE AND ALBUTEROL SULFATE 3 ML: .5; 3 SOLUTION RESPIRATORY (INHALATION) at 12:03

## 2019-03-17 RX ADMIN — ONDANSETRON 4 MG: 2 INJECTION, SOLUTION INTRAMUSCULAR; INTRAVENOUS at 07:03

## 2019-03-17 NOTE — HOSPITAL COURSE
3/17/19 1135 AM Admit, GBS neg, SROM @0940 this AM, clear fluid, desires TOLAC    2019 6:03 PM   Pt now with fever 101F. There is no progress of labor: cervix still 1cm and contractions are irregular. FHTs Cat 2: tachycardia with decreased variability.   With pt being remote from delivery, recommend  delivery. She agrees. R/a/b reviewed. Consents signed.        3/17/2019   Pre-operative Diagnosis:   2 previous uterine incision previous  delivery,  premature rupture of membranes, remote from delivery at 1cm cervical dilation, maternal fever, fetal tachycardia, (chorioamnionitis), decreased fetal heart tone reactivity  Post-operative Diagnosis: same  PROCEDURE: repeat low transverse  section  EBL 400mL, no complications    3/17-+influenza A, tamiflu started, droplet precautions started  Patient progressed well post operatively with expected symptoms of influenza but no post partum or post operative complications. She was appropriate for discharge POD3.

## 2019-03-17 NOTE — PROGRESS NOTES
S: tearful, feeling bad, malaise, temp 101  O:  VS reviewed, afebrile   Vitals:    19 1634 19 1659 19 1703 19 1704   BP:   (!) 84/56 (!) 96/45   Pulse:   (!) 123 (!) 127   Resp:       Temp: 99.7 °F (37.6 °C) (!) 101 °F (38.3 °C)     TempSrc: Oral Oral     SpO2:    98%   Weight:       Height:           FHTs Cat 2 baseline tachycardic 180 with accels  UC Q 2-5 min  SVE 1/60/v/-2 clear fluid    A: IUP @ 36w3d ;     Patient Active Problem List   Diagnosis    Previous  section    Mild tetrahydrocannabinol (THC) abuse    Encounter for supervision of normal pregnancy in multigravida    Acute midline low back pain    Amniotic fluid leaking    Asthma       P: Dr. Valencia notified of fever, lack of progress, remote from delivery, will proceed with repeat c/s

## 2019-03-17 NOTE — H&P
[facility] -   Obstetrics  History & Physical    Patient Name: Nena Nascimento  MRN: [id number]  Admission Date: 3/17/2019  Primary Care Provider: GEMMA Calvo    Subjective:     Principal Problem:Amniotic fluid leaking    History of Present Illness:  22yo  [place]UD[place] 19 EGA 36w3d arrived with SROM @ 0940 AM 3/17/19, clear fluid noted pooling, +valsalva and + nitrazine. Pt was late to care, previous c/s X 2, #1 for failed vac extraction after pushing, NRFHTs per pt, #2 scheduled repeat c/s. Pt is aware of uterine rupture rate, continuous EFM and Dr. Valencia readily available.     Obstetric HPI:       This pregnancy has been complicated by late to care, documented LTCS for arrest of second stage, failed vacuum extraction, #2 c/s repeat c/s, desires TOLAC    Obstetric History       T2      L2     SAB1   TAB0   Ectopic0   Multiple0   Live Births2       # Outcome Date GA Lbr Genaro/2nd Weight Sex Delivery Anes PTL Lv   5 Current            4 Term 03/26/15 39w0d  3.487 kg (7 lb 11 oz) M CS-Unspec   ALICIA   3 Term 11/05/10 40w0d  2.948 kg (6 lb 8 oz) F CS-Unspec   ALICIA   2 AB            1 SAB                 Past Medical History:   Diagnosis Date    Asthma      Past Surgical History:   Procedure Laterality Date     SECTION         PTA Medications   Medication Sig    acetaminophen (TYLENOL) 325 MG tablet Take 650 mg by mouth every 6 (six) hours as needed.    albuterol (ACCUNEB) 0.63 mg/3 mL Nebu Take 0.63 mg by nebulization every 6 (six) hours as needed. Rescue    fluticasone (FLONASE) 50 mcg/actuation nasal spray 2 sprays by Nasal route.    hydrOXYzine pamoate (VISTARIL) 25 MG Cap Take 1 capsule (25 mg total) by mouth every 6 (six) hours as needed.    montelukast (SINGULAIR) 10 mg tablet Take 10 mg by mouth.    pantoprazole (PROTONIX) 20 MG tablet Take 1 tablet (20 mg total) by mouth once daily.    prenatal vit,jimmie 74-iron-folic 27 mg iron- 1 mg Tab Take by mouth.        Review of patient's allergies indicates:   Allergen Reactions    Latex, natural rubber         Family History     None        Tobacco Use    Smoking status: Never Smoker    Smokeless tobacco: Never Used   Substance and Sexual Activity    Alcohol use: No     Frequency: Never    Drug use: Yes     Types: Marijuana     Comment: stopped during pregnancy     Sexual activity: Yes     Birth control/protection: None     Review of Systems   All other systems reviewed and are negative.     Objective:     Vital Signs (Most Recent):    Vital Signs (24h Range):           There is no height or weight on file to calculate BMI.    FHT:  Cat 1 (reassuring)  TOCO:  rare UC    Physical Exam:   Constitutional: She is oriented to person, place, and time. She appears well-developed and well-nourished.      Neck: Normal range of motion.    Cardiovascular: Normal rate.     Pulmonary/Chest: Effort normal.        Abdominal: Soft.   Gravid, EFW 6-7 #     Genitourinary: Vagina normal.           Musculoskeletal: Normal range of motion.       Neurological: She is alert and oriented to person, place, and time. She has normal reflexes.    Skin: Skin is warm and dry.    Psychiatric: She has a normal mood and affect. Her behavior is normal.       Cervix:  Dilation:  1  Effacement:  60  Station: -3  Presentation: Vertex     Significant Labs:  Lab Results   Component Value Date    GROUPTRH A POS 2018    HEPBSAG Negative 2018    STREPBCULT No Group B Streptococcus isolated 2019       I have personallly reviewed all pertinent lab results from the last 24 hours.    Assessment/Plan:     23 y.o. female  at 36w3d for:    * Amniotic fluid leaking    Admit, GBS neg, limit SVE, will augment labor if no spontaneous labor     Asthma    Albuterol inhaler PRN     Mild tetrahydrocannabinol (THC) abuse    UDS on admit     Previous  section    Desires TOLAC, #1 arrest of labor second stage, #2 scheduled repeat c/s          Pilar Brito CNM  Obstetrics  [facility]

## 2019-03-17 NOTE — SUBJECTIVE & OBJECTIVE
Obstetric HPI:       This pregnancy has been complicated by late to care, documented LTCS for arrest of second stage, failed vacuum extraction, #2 c/s repeat c/s, desires TOLAC    Obstetric History       T2      L2     SAB1   TAB0   Ectopic0   Multiple0   Live Births2       # Outcome Date GA Lbr Genaro/2nd Weight Sex Delivery Anes PTL Lv   5 Current            4 Term 03/26/15 39w0d  3.487 kg (7 lb 11 oz) M CS-Unspec   ALICIA   3 Term 11/05/10 40w0d  2.948 kg (6 lb 8 oz) F CS-Unspec   ALICIA   2 AB            1 SAB                 Past Medical History:   Diagnosis Date    Asthma      Past Surgical History:   Procedure Laterality Date     SECTION         PTA Medications   Medication Sig    acetaminophen (TYLENOL) 325 MG tablet Take 650 mg by mouth every 6 (six) hours as needed.    albuterol (ACCUNEB) 0.63 mg/3 mL Nebu Take 0.63 mg by nebulization every 6 (six) hours as needed. Rescue    fluticasone (FLONASE) 50 mcg/actuation nasal spray 2 sprays by Nasal route.    hydrOXYzine pamoate (VISTARIL) 25 MG Cap Take 1 capsule (25 mg total) by mouth every 6 (six) hours as needed.    montelukast (SINGULAIR) 10 mg tablet Take 10 mg by mouth.    pantoprazole (PROTONIX) 20 MG tablet Take 1 tablet (20 mg total) by mouth once daily.    prenatal vit,jimmie 74-iron-folic 27 mg iron- 1 mg Tab Take by mouth.       Review of patient's allergies indicates:   Allergen Reactions    Latex, natural rubber         Family History     None        Tobacco Use    Smoking status: Never Smoker    Smokeless tobacco: Never Used   Substance and Sexual Activity    Alcohol use: No     Frequency: Never    Drug use: Yes     Types: Marijuana     Comment: stopped during pregnancy     Sexual activity: Yes     Birth control/protection: None     Review of Systems   All other systems reviewed and are negative.     Objective:     Vital Signs (Most Recent):    Vital Signs (24h Range):           There is no height or weight on file to  calculate BMI.    FHT:  Cat 1 (reassuring)  TOCO:  rare UC    Physical Exam:   Constitutional: She is oriented to person, place, and time. She appears well-developed and well-nourished.      Neck: Normal range of motion.    Cardiovascular: Normal rate.     Pulmonary/Chest: Effort normal.        Abdominal: Soft.   Gravid, EFW 6-7 #     Genitourinary: Vagina normal.           Musculoskeletal: Normal range of motion.       Neurological: She is alert and oriented to person, place, and time. She has normal reflexes.    Skin: Skin is warm and dry.    Psychiatric: She has a normal mood and affect. Her behavior is normal.       Cervix:  Dilation:  1  Effacement:  60  Station: -3  Presentation: Vertex     Significant Labs:  Lab Results   Component Value Date    GROUPTRH A POS 12/04/2018    HEPBSAG Negative 12/04/2018    STREPBCULT No Group B Streptococcus isolated 03/08/2019       I have personallly reviewed all pertinent lab results from the last 24 hours.

## 2019-03-17 NOTE — HPI
24yo  [place]UD[place] 19 EGA 36w3d arrived with SROM @ 0940 AM 3/17/19, clear fluid noted pooling, +valsalva and + nitrazine. Pt was late to care, previous c/s X 2, #1 for failed vac extraction after pushing, NRFHTs per pt, #2 scheduled repeat c/s. Pt is aware of uterine rupture rate, continuous EFM and Dr. Valencia readily available.

## 2019-03-17 NOTE — ANESTHESIA PROCEDURE NOTES
Spinal    Diagnosis: IUP repeat c-sction   Patient location during procedure: OB  Start time: 3/17/2019 6:42 PM  Timeout: 3/17/2019 6:47 PM  End time: 3/17/2019 6:51 PM  Staffing  Anesthesiologist: Jaylen Huntley MD  Performed: anesthesiologist   Preanesthetic Checklist  Completed: patient identified, surgical consent, pre-op evaluation, timeout performed, IV checked, risks and benefits discussed and monitors and equipment checked  Spinal Block  Patient position: sitting  Prep: ChloraPrep  Patient monitoring: heart rate, cardiac monitor, continuous pulse ox and frequent blood pressure checks  Approach: midline  Location: L4-5  Injection technique: single shot  CSF Fluid: clear free-flowing CSF  Needle  Needle type: Quincke   Needle gauge: 25 G  Needle length: 3.5 in  Additional Documentation: incremental injection, negative aspiration for heme and no paresthesia on injection  Needle localization: anatomical landmarks  Assessment  Sensory level: T6   Dermatomal levels determined by pinch or prick  Ease of block: easy  Patient's tolerance of the procedure: comfortable throughout block and no complaints

## 2019-03-17 NOTE — PROGRESS NOTES
Nena Nascimento [id number] is a 23 y.o. female who has been consulted for Gentamicin dosing     The patient has the following labs:     Date  Creatinine (mg/dl)  BUN  WBC Count  Scr ordered by Pharmacy     Tmax 101      Lab Results  Component  Value  Date  WBC  11.54  03/17/2019      The patient will be started on Gentamicin extended interval synergy dosing (ampicillin) for treatment of chorioamnionitis at a dose of 320 mg (5 mg/kg x actual BW) every 24 hours.   The trough has been ordered for 3/18 at 1700.      Thank you for allowing us to participate in this patient's care.     Shirley Dubon

## 2019-03-17 NOTE — PROGRESS NOTES
CNM notified of elevated axillary maternal temp and fetal tachycardia. Continuing to monitor at this time.

## 2019-03-17 NOTE — PROGRESS NOTES
"Discussed feeding choice with mother.  Reviewed benefits of breastfeeding and risks of formula feeding. Patient given "What to Expect in the First 48 Hours" handout. Mother states her intention is formula feeding. Coffective counseling sheet Fall in Love discussed with mother. Reinforced immediate skin to skin, the magic first hour, importance of the first feeding and delaying routine procedures. Encouraged mother to download Coffective mobile carla if she has not already done so. Mother verbalies understanding.Formula Feeding Handout given and reviewed. Discussed proper hand washing, expiration time of formula, position of nipple and bottle while feeding, baby led feeding and fullness cues. Patient verbalized understanding and verbalized appropriate recall.  "

## 2019-03-17 NOTE — ANESTHESIA PREPROCEDURE EVALUATION
2019  Nena Nascimento is a 23 y.o., female.    Pre-op Assessment    I have reviewed the Patient Summary Reports.     I have reviewed the Nursing Notes.   I have reviewed the Medications.     Review of Systems  Anesthesia Hx:  No problems with previous Anesthesia  History of prior surgery of interest to airway management or planning: Previous anesthesia: Epidural, Spinal Denies Family Hx of Anesthesia complications.   Denies Personal Hx of Anesthesia complications.   Social:  Smoker, Social Alcohol Use Hx of Marijuana use   Hematology/Oncology:         -- Anemia:   EENT/Dental:EENT/Dental Normal   Cardiovascular:  Cardiovascular Normal     Pulmonary:   Asthma    Renal/:  Renal/ Normal     Hepatic/GI:  Hepatic/GI Normal    Musculoskeletal:  Musculoskeletal Normal    OB/GYN/PEDS:  G4L2- previous  x 2   Neurological:  Neurology Normal    Endocrine:  Endocrine Normal    Psych:  Psychiatric Normal           Physical Exam  General:  Well nourished    Airway/Jaw/Neck:  Airway Findings: Mouth Opening: Normal Tongue: Normal  General Airway Assessment: Adult  Mallampati: IV  Improves to IV with phonation.  TM Distance: Normal, at least 6 cm       Chest/Lungs:  Chest/Lungs Findings: Normal Respiratory Rate     Heart/Vascular:  Heart Findings: Rate: Normal        Mental Status:  Mental Status Findings:  Cooperative, Alert and Oriented         Anesthesia Plan  Type of Anesthesia, risks & benefits discussed:  Anesthesia Type:  spinal  Patient's Preference:   Intra-op Monitoring Plan: standard ASA monitors  Intra-op Monitoring Plan Comments:   Post Op Pain Control Plan:   Post Op Pain Control Plan Comments:   Induction:    Beta Blocker:         Informed Consent: Patient understands risks and agrees with Anesthesia plan.  Questions answered. Anesthesia consent signed with patient.  ASA Score: 2     Day of  Surgery Review of History & Physical:  There are no significant changes.

## 2019-03-18 PROBLEM — J10.1 INFLUENZA A: Status: ACTIVE | Noted: 2019-03-18

## 2019-03-18 PROCEDURE — 63600175 PHARM REV CODE 636 W HCPCS: Performed by: OBSTETRICS & GYNECOLOGY

## 2019-03-18 PROCEDURE — 94640 AIRWAY INHALATION TREATMENT: CPT

## 2019-03-18 PROCEDURE — 25000003 PHARM REV CODE 250: Performed by: ADVANCED PRACTICE MIDWIFE

## 2019-03-18 PROCEDURE — 99232 PR SUBSEQUENT HOSPITAL CARE,LEVL II: ICD-10-PCS | Mod: ,,, | Performed by: OBSTETRICS & GYNECOLOGY

## 2019-03-18 PROCEDURE — S0077 INJECTION, CLINDAMYCIN PHOSP: HCPCS | Performed by: OBSTETRICS & GYNECOLOGY

## 2019-03-18 PROCEDURE — 11000001 HC ACUTE MED/SURG PRIVATE ROOM

## 2019-03-18 PROCEDURE — 99232 SBSQ HOSP IP/OBS MODERATE 35: CPT | Mod: ,,, | Performed by: OBSTETRICS & GYNECOLOGY

## 2019-03-18 PROCEDURE — 25000242 PHARM REV CODE 250 ALT 637 W/ HCPCS: Performed by: OBSTETRICS & GYNECOLOGY

## 2019-03-18 PROCEDURE — 25000003 PHARM REV CODE 250: Performed by: OBSTETRICS & GYNECOLOGY

## 2019-03-18 RX ORDER — GUAIFENESIN/DEXTROMETHORPHAN 100-10MG/5
5 SYRUP ORAL EVERY 4 HOURS PRN
Status: DISCONTINUED | OUTPATIENT
Start: 2019-03-18 | End: 2019-03-19

## 2019-03-18 RX ORDER — HYDROXYZINE PAMOATE 50 MG/1
50 CAPSULE ORAL ONCE
Status: COMPLETED | OUTPATIENT
Start: 2019-03-18 | End: 2019-03-18

## 2019-03-18 RX ORDER — HYDROXYZINE PAMOATE 50 MG/1
50 CAPSULE ORAL EVERY 8 HOURS PRN
Status: DISCONTINUED | OUTPATIENT
Start: 2019-03-18 | End: 2019-03-20 | Stop reason: HOSPADM

## 2019-03-18 RX ORDER — IPRATROPIUM BROMIDE AND ALBUTEROL SULFATE 2.5; .5 MG/3ML; MG/3ML
3 SOLUTION RESPIRATORY (INHALATION) EVERY 6 HOURS PRN
Status: DISCONTINUED | OUTPATIENT
Start: 2019-03-18 | End: 2019-03-20 | Stop reason: HOSPADM

## 2019-03-18 RX ADMIN — OSELTAMIVIR PHOSPHATE 75 MG: 75 CAPSULE ORAL at 08:03

## 2019-03-18 RX ADMIN — IBUPROFEN 600 MG: 600 TABLET ORAL at 05:03

## 2019-03-18 RX ADMIN — AMPICILLIN SODIUM 2 G: 2 INJECTION, POWDER, FOR SOLUTION INTRAMUSCULAR; INTRAVENOUS at 05:03

## 2019-03-18 RX ADMIN — GUAIFENESIN AND DEXTROMETHORPHAN 5 ML: 100; 10 SYRUP ORAL at 01:03

## 2019-03-18 RX ADMIN — HYDROMORPHONE HYDROCHLORIDE 1 MG: 1 INJECTION, SOLUTION INTRAMUSCULAR; INTRAVENOUS; SUBCUTANEOUS at 05:03

## 2019-03-18 RX ADMIN — HYDROXYZINE PAMOATE 50 MG: 50 CAPSULE ORAL at 02:03

## 2019-03-18 RX ADMIN — HYDROCODONE BITARTRATE AND ACETAMINOPHEN 1 TABLET: 10; 325 TABLET ORAL at 03:03

## 2019-03-18 RX ADMIN — CHLORHEXIDINE GLUCONATE 0.12% ORAL RINSE 10 ML: 1.2 LIQUID ORAL at 08:03

## 2019-03-18 RX ADMIN — IPRATROPIUM BROMIDE AND ALBUTEROL SULFATE 3 ML: .5; 3 SOLUTION RESPIRATORY (INHALATION) at 02:03

## 2019-03-18 RX ADMIN — CLINDAMYCIN IN 5 PERCENT DEXTROSE 900 MG: 18 INJECTION, SOLUTION INTRAVENOUS at 12:03

## 2019-03-18 RX ADMIN — PROMETHAZINE HYDROCHLORIDE 12.5 MG: 25 INJECTION INTRAMUSCULAR; INTRAVENOUS at 04:03

## 2019-03-18 RX ADMIN — GENTAMICIN SULFATE 320 MG: 40 INJECTION, SOLUTION INTRAMUSCULAR; INTRAVENOUS at 06:03

## 2019-03-18 RX ADMIN — IPRATROPIUM BROMIDE AND ALBUTEROL SULFATE 3 ML: .5; 3 SOLUTION RESPIRATORY (INHALATION) at 08:03

## 2019-03-18 RX ADMIN — DOCUSATE SODIUM - SENNOSIDES 1 TABLET: 50; 8.6 TABLET, FILM COATED ORAL at 08:03

## 2019-03-18 RX ADMIN — HYDROCODONE BITARTRATE AND ACETAMINOPHEN 1 TABLET: 10; 325 TABLET ORAL at 09:03

## 2019-03-18 RX ADMIN — HYDROCODONE BITARTRATE AND ACETAMINOPHEN 1 TABLET: 10; 325 TABLET ORAL at 04:03

## 2019-03-18 RX ADMIN — CLINDAMYCIN IN 5 PERCENT DEXTROSE 900 MG: 18 INJECTION, SOLUTION INTRAVENOUS at 04:03

## 2019-03-18 RX ADMIN — AMPICILLIN SODIUM 2 G: 2 INJECTION, POWDER, FOR SOLUTION INTRAMUSCULAR; INTRAVENOUS at 11:03

## 2019-03-18 RX ADMIN — IPRATROPIUM BROMIDE AND ALBUTEROL SULFATE 3 ML: .5; 3 SOLUTION RESPIRATORY (INHALATION) at 07:03

## 2019-03-18 RX ADMIN — IBUPROFEN 600 MG: 600 TABLET ORAL at 11:03

## 2019-03-18 RX ADMIN — GUAIFENESIN AND DEXTROMETHORPHAN 5 ML: 100; 10 SYRUP ORAL at 05:03

## 2019-03-18 RX ADMIN — HYDROMORPHONE HYDROCHLORIDE 1 MG: 1 INJECTION, SOLUTION INTRAMUSCULAR; INTRAVENOUS; SUBCUTANEOUS at 01:03

## 2019-03-18 RX ADMIN — PANTOPRAZOLE SODIUM 40 MG: 40 TABLET, DELAYED RELEASE ORAL at 08:03

## 2019-03-18 RX ADMIN — ONDANSETRON 8 MG: 8 TABLET, ORALLY DISINTEGRATING ORAL at 02:03

## 2019-03-18 NOTE — ASSESSMENT & PLAN NOTE
3/17/2019   Pre-operative Diagnosis:   2 previous uterine incision previous  delivery,  premature rupture of membranes, remote from delivery at 1cm cervical dilation, maternal fever, fetal tachycardia, (chorioamnionitis), decreased fetal heart tone reactivity  Post-operative Diagnosis: same  PROCEDURE: repeat low transverse  section  EBL 400mL, no complications

## 2019-03-18 NOTE — ANESTHESIA RELEASE NOTE
"Anesthesia Release from PACU Note    Patient: Nena Nascimento    Procedure(s) Performed: Procedure(s) (LRB):   SECTION (N/A)    Anesthesia type: spinal    Post pain: Adequate analgesia    Post assessment: no apparent anesthetic complications and tolerated procedure well    Last Vitals:   Visit Vitals  BP (!) 95/45 (BP Location: Right arm, Patient Position: Lying)   Pulse (!) 114   Temp 37 °C (98.6 °F)   Resp 20   Ht 5' 3" (1.6 m)   Wt 64 kg (141 lb)   LMP 2018   SpO2 95%   BMI 24.98 kg/m²       Post vital signs: stable    Level of consciousness: awake, alert  and oriented    Nausea/Vomiting: no nausea/no vomiting    Complications: none    Airway Patency: patent    Respiratory: unassisted, spontaneous ventilation, room air    Cardiovascular: stable and blood pressure at baseline    Hydration: euvolemic  "

## 2019-03-18 NOTE — ANESTHESIA POSTPROCEDURE EVALUATION
"Anesthesia Post Evaluation    Patient: Nena Nascimento    Procedure(s) Performed: Procedure(s) (LRB):   SECTION (N/A)    Final Anesthesia Type: spinal  Patient location during evaluation: labor & delivery  Patient participation: Yes- Able to Participate  Level of consciousness: awake and alert  Post-procedure vital signs: reviewed and stable  Pain management: adequate  Airway patency: patent  PONV status at discharge: No PONV  Anesthetic complications: no      Cardiovascular status: blood pressure returned to baseline  Respiratory status: unassisted, room air and spontaneous ventilation  Hydration status: euvolemic  Follow-up not needed.        Visit Vitals  BP (!) 95/45 (BP Location: Right arm, Patient Position: Lying)   Pulse (!) 114   Temp 37 °C (98.6 °F)   Resp 20   Ht 5' 3" (1.6 m)   Wt 64 kg (141 lb)   LMP 2018   SpO2 95%   BMI 24.98 kg/m²       Pain/Carline Score: Pain Rating Prior to Med Admin: 4 (3/17/2019  5:09 PM)  Pain Rating Post Med Admin: 0 (3/17/2019  4:00 PM)        "

## 2019-03-18 NOTE — PLAN OF CARE
Problem: Adult Inpatient Plan of Care  Goal: Plan of Care Review  Outcome: Ongoing (interventions implemented as appropriate)  Pt admitted for SROM. Attempted . Pit started. Maternal hyperthermia and fetal tachycardia dx'd as chorio. ABX started. No cervical change. C/S called. Pt transferred to OR at shift change.

## 2019-03-18 NOTE — SUBJECTIVE & OBJECTIVE
Hospital course: 3/17/19 1135 AM Admit, GBS neg, SROM @0940 this AM, clear fluid, desires TOLAC    2019 6:03 PM   Pt now with fever 101F. There is no progress of labor: cervix still 1cm and contractions are irregular. FHTs Cat 2: tachycardia with decreased variability.   With pt being remote from delivery, recommend  delivery. She agrees. R/a/b reviewed. Consents signed.        3/17/2019   Pre-operative Diagnosis:   2 previous uterine incision previous  delivery,  premature rupture of membranes, remote from delivery at 1cm cervical dilation, maternal fever, fetal tachycardia, (chorioamnionitis), decreased fetal heart tone reactivity  Post-operative Diagnosis: same  PROCEDURE: repeat low transverse  section  EBL 400mL, no complications     Interval History:      She is doing well this morning. She has a clear cough, that clears effectively after her albuterol treatment. Does not have wheeze, or any productive cough o/w. No CP SOB or palpitations. generallized weakness is present but tolerable. No joint pains.   She is tolerating a regular diet without nausea or vomiting. She is not yet voiding spontaneously - chapman still in. She is not yet  ambulating. She has passed flatus, and has not a BM. Vaginal bleeding is mild. She denies fever or chills. Abdominal pain is mild and controlled with oral medications. She is breastfeeding.      Objective:     Vital Signs (Most Recent):  Temp: 98.5 °F (36.9 °C) (19 0400)  Pulse: 97 (19 0400)  Resp: 20 (19 0400)  BP: (!) 104/58 (19 0400)  SpO2: 100 % (19 0250) Vital Signs (24h Range):  Temp:  [98.5 °F (36.9 °C)-101 °F (38.3 °C)] 98.5 °F (36.9 °C)  Pulse:  [] 97  Resp:  [18-20] 20  SpO2:  [93 %-100 %] 100 %  BP: ()/(29-70) 104/58     Weight: 64 kg (141 lb)  Body mass index is 24.98 kg/m².      Intake/Output Summary (Last 24 hours) at 3/18/2019 0745  Last data filed at 3/18/2019 0559  Gross per 24 hour    Intake 1350 ml   Output 2550 ml   Net -1200 ml       Significant Labs:  Lab Results   Component Value Date    GROUPTRH A POS 03/17/2019    HEPBSAG Negative 12/04/2018    STREPBCULT No Group B Streptococcus isolated 03/08/2019     Recent Labs   Lab 03/17/19  1140   HGB 10.8*   HCT 31.6*       I have personallly reviewed all pertinent lab results from the last 24 hours.    Physical Exam:   Constitutional: She is oriented to person, place, and time. She appears well-developed and well-nourished. No distress.    HENT:   Head: Normocephalic.     Neck: Normal range of motion. No thyromegaly present.    Cardiovascular: Normal rate, regular rhythm, normal heart sounds and intact distal pulses.    No murmur heard.   Pulmonary/Chest: Effort normal and breath sounds normal. No respiratory distress. She has no wheezes. She has no rales.        Abdominal: Soft. Bowel sounds are normal. She exhibits abdominal incision (Aquacel dressing in place, clear/dry/intact). She exhibits no distension. There is no tenderness. There is no guarding.   Uterine fundus firm, below umbilicus, mild tenderness (appropriate)     Genitourinary:   Genitourinary Comments: Lochia within normal           Musculoskeletal: Normal range of motion and moves all extremeties. She exhibits no edema.   No calf tenderness       Neurological: She is alert and oriented to person, place, and time. She has normal reflexes.    Skin: Skin is warm and dry. No rash noted. She is not diaphoretic.    Psychiatric: She has a normal mood and affect.

## 2019-03-18 NOTE — DISCHARGE INSTRUCTIONS
"Mother Self Care:    Activity: Avoid strenuous exercise and get adequate rest.  No driving until the physician consent given.  Emotional Changes: Most women find birth to be a time of great emotional upheaval.  Sense of loss, mood swings, fatigue, anxiety, and feeling "let down" are common.  If feelings worsen or last more than a week, call your physician.  Breast Care/Breastfeeding: Wear a bra for comfort.  Keep nipples dry and apply your own breast milk or lanolin cream as needed for soreness.  Engorgement can be relieved with warm, moist heat before feedings.  You may also take Ibuprofen.  Breast Care/Bottle Feeding: Wear support bra 24 hours a day for one week.  Avoid stimulation to breasts.  You may use ice packs for discomfort.  Tricia-Care/Vaginal Bleeding: Remember to use your tricia-bottle after urinating.  Your flow will change from red, to pink, to yellow/white color over a period of 2 weeks.  Menstruation will return in 3-8 weeks, or longer if breastfeeding.   Section/Tubal Ligation: Keep incision clean and dry. You may shower, but avoid baths.  Sexual Activity/Pelvic Rest: No sexual activity, tampons, or douching until your physician gives you consent.  Diet: Continue to eat from the five basic food groups, including plenty of protein, fruits, vegetables, and whole grains.  Limit empty calories and high fat foods.  Drink enough fluids to satisfy thirst and add an extra 500 calories for breastfeeding.  Constipation/Hemorrhoids: Drink plenty of water.  You may take a stool softener or natural laxative (Metamucil). You may use tucks or hemorrhoid ointment and soak in a warm tub.    CALL YOUR OB DOCTOR IF ANY OF THE FOLLOWING OCCURS:  *Heavy bleeding - saturating a pad an hour or passing any large (2-3 inches in size) blood clots.  *Any pain, redness, or tenderness in lower leg.  *You cannot care for yourself or your baby.  *Any signs of infection-      - Temperature greater than 100.5 degrees F      - " Foul smelling vaginal discharge and/or incisional drainage      - Increased episiotomy or incisional pain      - Hot, hard, red or sore area on breast      - Flu-like symptoms      - Any urgency, frequency or burning with urination    Return To the Hospital for further Evaluation:  · Headache not relieved by tylenol or ibuprofen  · Blurry vision, double vision, seeing spots, or flashing lights  · Feeling faint or passing out  · Right epigastric pain  · Difficulty breathing  · Swelling in hands, face, or feet  · Any of these symptoms accompanied by nausea/vomiting  · Gaining more than 5 pounds in one week  · Seizures  These symptoms could be an indication of elevated blood pressure.       If you have any questions that need to be answered immediately please call the Labor & Delivery Unit at [phone] and ask to speak to a nurse.

## 2019-03-18 NOTE — PROGRESS NOTES
[facility] -   Obstetrics  Postpartum Progress Note    Patient Name: Nena Nascimento  MRN: [id number]  Admission Date: 3/17/2019  Hospital Length of Stay: 1 days  Attending Physician: Allison Valencia MD  Primary Care Provider: GEMMA Calvo    Subjective:     Principal Problem:S/P  section    Hospital course: 3/17/19 1135 AM Admit, GBS neg, SROM @0940 this AM, clear fluid, desires TOLAC    2019 6:03 PM   Pt now with fever 101F. There is no progress of labor: cervix still 1cm and contractions are irregular. FHTs Cat 2: tachycardia with decreased variability.   With pt being remote from delivery, recommend  delivery. She agrees. R/a/b reviewed. Consents signed.        3/17/2019   Pre-operative Diagnosis:   2 previous uterine incision previous  delivery,  premature rupture of membranes, remote from delivery at 1cm cervical dilation, maternal fever, fetal tachycardia, (chorioamnionitis), decreased fetal heart tone reactivity  Post-operative Diagnosis: same  PROCEDURE: repeat low transverse  section  EBL 400mL, no complications     Interval History:      She is doing well this morning. She has a clear cough, that clears effectively after her albuterol treatment. Does not have wheeze, or any productive cough o/w. No CP SOB or palpitations. generallized weakness is present but tolerable. No joint pains.   She is tolerating a regular diet without nausea or vomiting. She is not yet voiding spontaneously - chapman still in. She is not yet  ambulating. She has passed flatus, and has not a BM. Vaginal bleeding is mild. She denies fever or chills. Abdominal pain is mild and controlled with oral medications. She is breastfeeding.      Objective:     Vital Signs (Most Recent):  Temp: 98.5 °F (36.9 °C) (19)  Pulse: 97 (19)  Resp: 20 (19)  BP: (!) 104/58 (19)  SpO2: 100 % (19 0250) Vital Signs (24h Range):  Temp:   [98.5 °F (36.9 °C)-101 °F (38.3 °C)] 98.5 °F (36.9 °C)  Pulse:  [] 97  Resp:  [18-20] 20  SpO2:  [93 %-100 %] 100 %  BP: ()/(29-70) 104/58     Weight: 64 kg (141 lb)  Body mass index is 24.98 kg/m².      Intake/Output Summary (Last 24 hours) at 3/18/2019 0745  Last data filed at 3/18/2019 0559  Gross per 24 hour   Intake 1350 ml   Output 2550 ml   Net -1200 ml       Significant Labs:  Lab Results   Component Value Date    GROUPTRH A POS 2019    HEPBSAG Negative 2018    STREPBCULT No Group B Streptococcus isolated 2019     Recent Labs   Lab 19  1140   HGB 10.8*   HCT 31.6*       I have personallly reviewed all pertinent lab results from the last 24 hours.    Physical Exam:   Constitutional: She is oriented to person, place, and time. She appears well-developed and well-nourished. No distress.    HENT:   Head: Normocephalic.     Neck: Normal range of motion. No thyromegaly present.    Cardiovascular: Normal rate, regular rhythm, normal heart sounds and intact distal pulses.    No murmur heard.   Pulmonary/Chest: Effort normal and breath sounds normal. No respiratory distress. She has no wheezes. She has no rales.        Abdominal: Soft. Bowel sounds are normal. She exhibits abdominal incision (Aquacel dressing in place, clear/dry/intact). She exhibits no distension. There is no tenderness. There is no guarding.   Uterine fundus firm, below umbilicus, mild tenderness (appropriate)     Genitourinary:   Genitourinary Comments: Lochia within normal           Musculoskeletal: Normal range of motion and moves all extremeties. She exhibits no edema.   No calf tenderness       Neurological: She is alert and oriented to person, place, and time. She has normal reflexes.    Skin: Skin is warm and dry. No rash noted. She is not diaphoretic.    Psychiatric: She has a normal mood and affect.       Assessment/Plan:     23 y.o. female  for:    * S/P  section    3/17/2019    Pre-operative Diagnosis:   2 previous uterine incision previous  delivery,  premature rupture of membranes, remote from delivery at 1cm cervical dilation, maternal fever, fetal tachycardia, (chorioamnionitis), decreased fetal heart tone reactivity  Post-operative Diagnosis: same  PROCEDURE: repeat low transverse  section  EBL 400mL, no complications     Influenza A    Continue tamiflu 75mg twice daily, along with contact precautions.     Chorioamnionitis in third trimester      24hours ampicillin, gentamicin, clindamycin postoperatively      Asthma    Albuterol inhaler PRN   Will continue nebulizer treatments.      Amniotic fluid leaking    Admit, GBS neg, limit SVE, will augment labor if no spontaneous labor      Mild tetrahydrocannabinol (THC) abuse    UDS on admit      Previous  section    Desires TOLAC, #1 arrest of labor second stage, #2 scheduled repeat c/s        Addendum: lung exam performed: CTAB, no W/R/R    Disposition: As patient meets milestones, will plan to discharge  .    Allison Valencia MD  Obstetrics  [facility] [place]

## 2019-03-18 NOTE — LACTATION NOTE
Notified by patient's nurse, June, that patient would like to breastfeed. Informed nurse that lactation will not be able see patient without an order from baby's pediatrician due to history of drug use with positive UDS on admit. To notify lactation if order received from baby's pediatrician stating that baby can breastfeed.

## 2019-03-18 NOTE — TRANSFER OF CARE
"Anesthesia Transfer of Care Note    Patient: Nena Nascimento    Procedure(s) Performed: Procedure(s) (LRB):   SECTION (N/A)    Patient location: Labor and Delivery    Anesthesia Type: spinal    Transport from OR: Transported from OR on room air with adequate spontaneous ventilation    Post pain: adequate analgesia    Post assessment: no apparent anesthetic complications    Post vital signs: stable    Level of consciousness: awake, alert and oriented    Nausea/Vomiting: no nausea/vomiting    Complications: none          Last vitals:   Visit Vitals  BP (!) 95/45 (BP Location: Right arm, Patient Position: Lying)   Pulse (!) 114   Temp 37 °C (98.6 °F)   Resp 20   Ht 5' 3" (1.6 m)   Wt 64 kg (141 lb)   LMP 2018   SpO2 95%   BMI 24.98 kg/m²     "

## 2019-03-18 NOTE — PLAN OF CARE
Problem: Adult Inpatient Plan of Care  Goal: Plan of Care Review  Outcome: Ongoing (interventions implemented as appropriate)  Pt afebrile and had no falls this shift. Fundus firm w/out massage and below umbilicus. Bleeding light, no clots passed this shift. Catheter patency maintained. Bedrest. Pain still present after medication administration, but does respond to medicaiton. VSS at this time, blood pressure runs low. Bonding well with infant; responds to infant cues and participates in infant care. Patient had a positive influenza A on 3/17/2019. Nausea and vomiting experienced this shift; zofran and phenergan given Will continue to monitor.

## 2019-03-18 NOTE — PROGRESS NOTES
Met with patient per consult. Explained mandated reporting of drug exposed newborns. Patient admits to eating a brownie with THC in it. Report called into DCFS due to 's +UDS for THC. Report #[id number]

## 2019-03-18 NOTE — L&D DELIVERY NOTE
[facility]   Section   Operative Note    SUMMARY        OPERATIVE NOTE    3/17/2019     PRE-PROCEDURE COUNSELING  Patient counseled on the risks, benefits, and alternatives to procedure.  Please see preoperative consents.   SCDs were applied and working prior to anesthesia induction.    Pre-operative Diagnosis:   2 previous uterine incision previous  delivery,  premature rupture of membranes, remote from delivery at 1cm cervical dilation, maternal fever, fetal tachycardia, (chorioamnionitis), decreased fetal heart tone reactivity    Post-operative Diagnosis: same    PROCEDURE: repeat low transverse  section    Surgeon: Allison Valencia MD    Assistants: Bernardo Ashraf CST    Anesthesia: epidural anesthesia    Procedure Details   The patient was seen in the Holding Room. The risks, benefits and alternatives were discussed. The patient concurred with the proposed plan, giving informed consent.  The patient was taken to Operating Room, identified as Nena Nascimento    and the procedure verified as  Delivery. A Time Out was held and the above information confirmed.    After induction of anesthesia, the patient was draped and prepped in the usual sterile manner. A Pfannenstiel incision was made and carried down through the subcutaneous tissue to the fascia. Fascial incision was made and extended transversely. The fascia was  from the underlying rectus tissue superiorly and inferiorly. The peritoneum was identified and entered bluntly then extended superiorly and inferiorly with good visualization of the underlying bowel and bladder.   The utero-vesical peritoneal reflection was incised transversely and the bladder flap was noted to be adhesed midway up the anterior uterine wall. The adhesions were sharply taken down with Metzenbaums. Next, a low transverse uterine incision was made. There was clear amniotic fluid noted. The baby girl 6'6 Liz was  delivered from vertex  presentation,   and with Apgar scores of 9/9 . The umbilical cord was clamped and cut, and cord blood was obtained. The placenta was removed intact and appeared normal .  The uterine incision was reapproximated with running locked sutures of 0 Chromic.     Lavage was performed and hemostasis noted.. The fascia was then reapproximated with running sutures of 0 loop PDS. The skin was reapproximated with 4-0 vicryl in a subcuticular fashion.  Aquacel bandage was placed over the incision.  Instrument, sponge, and needle counts were correct prior the abdominal closure and at the conclusion of the case.     Estimated Blood Loss:  400mL           Specimens: none            Complications:  none           Disposition: to recovery PP room           Condition: stable                     Delivery Information for  Joyce Nascimento    Birth information:  YOB: 2021   Time of birth: 7:14 PM   Sex: female   Head Delivery Date/Time: 3/17/2019  7:14 PM   Delivery type: , Low Transverse   Gestational Age: 36w3d    Delivery Providers    Delivering clinician:  Allison Valencia MD   Provider Role    Norah Portillo RN Registered Nurse    Justin Logan, [facility] Nurse Anesthetist    Sheridan Jordan RN Registered Nurse    Diogenes Julian, [facility] First Assist            Measurements    Weight:  2350 g  Length:  48.5 cm  Head circumference:  30.5 cm  Chest circumference:  28 cm  Abdominal girth:  26 cm         Apgars    Living status:  Living  Apgars:   1 min.:   5 min.:   10 min.:   15 min.:   20 min.:     Skin color:   1  1       Heart rate:   2  2       Reflex irritability:   2  2       Muscle tone:   2  2       Respiratory effort:   2  2       Total:   9  9       Apgars assigned by:  AMNA PORTILLO RN         Operative Delivery    Forceps attempted?:  No  Vacuum extractor attempted?:  No         Shoulder Dystocia    Shoulder dystocia present?:  No           Presentation     Presentation:  Vertex  Position:  Left Occiput Anterior           Interventions/Resuscitation    Method:  Bulb Suctioning, Tactile Stimulation       Cord    Vessels:  3 vessels  Complications:  None  Delayed Cord Clamping?:  Yes  Cord Clamped Date/Time:  3/17/2019  7:15 PM  Cord Blood Disposition:  Lab  Gases Sent?:  No  Stem Cell Collection (by MD):  No       Placenta    Placenta delivery date/time:  3/17/2019 1917  Placenta removal:  Manual removal  Placenta appearance:  Intact  Placenta disposition:  discarded           Labor Events:       labor: Yes     Labor Onset Date/Time:         Dilation Complete Date/Time:         Start Pushing Date/Time:       Rupture Date/Time:              Rupture type:           Fluid Amount:        Fluid Color:        Fluid Odor:        Membrane Status (PeriCalm): SRM (Spontaneous Rupture)      Rupture Date/Time (PeriCalm): 2019 09:40:00      Fluid Amount (PeriCalm): Moderate      Fluid Color (PeriCalm): Clear       steroids: None     Antibiotics given for GBS: No     Induction: none     Indications for induction:        Augmentation:       Indications for augmentation:       Labor complications: Chorioamnionitis     Additional complications:          Cervical ripening:                     Delivery:      Episiotomy: None     Indication for Episiotomy:       Perineal Lacerations: None Repaired:      Periurethral Laceration: none Repaired:     Labial Laceration: none Repaired:     Sulcus Laceration: none Repaired:     Vaginal Laceration: No Repaired:     Cervical Laceration: No Repaired:     Repair suture: None     Repair # of packets:       Vaginal delivery QBL (mL): 0      QBL (mL): 0     Combined Blood Loss (mL): 0     Vaginal Sweep Performed:       Surgicount Correct:         Other providers:       Anesthesia    Method:  Spinal          Details (if applicable):  Trial of Labor No    Categorization: Repeat    Priority:      Indications for : Prior Uterine Surgery   Incision Type: low transverse     Additional  information:  Forceps:    Vacuum:    Breech:    Observed anomalies    Other (Comments):

## 2019-03-19 PROBLEM — O42.90 AMNIOTIC FLUID LEAKING: Status: RESOLVED | Noted: 2019-03-17 | Resolved: 2019-03-19

## 2019-03-19 PROBLEM — Z98.891 PREVIOUS CESAREAN SECTION: Status: RESOLVED | Noted: 2018-12-04 | Resolved: 2019-03-19

## 2019-03-19 PROBLEM — M54.50 ACUTE MIDLINE LOW BACK PAIN: Status: RESOLVED | Noted: 2019-02-06 | Resolved: 2019-03-19

## 2019-03-19 PROCEDURE — 94640 AIRWAY INHALATION TREATMENT: CPT

## 2019-03-19 PROCEDURE — 25000003 PHARM REV CODE 250: Performed by: OBSTETRICS & GYNECOLOGY

## 2019-03-19 PROCEDURE — 99231 SBSQ HOSP IP/OBS SF/LOW 25: CPT | Mod: ,,, | Performed by: OBSTETRICS & GYNECOLOGY

## 2019-03-19 PROCEDURE — 11000001 HC ACUTE MED/SURG PRIVATE ROOM

## 2019-03-19 PROCEDURE — 99231 PR SUBSEQUENT HOSPITAL CARE,LEVL I: ICD-10-PCS | Mod: ,,, | Performed by: OBSTETRICS & GYNECOLOGY

## 2019-03-19 PROCEDURE — 25000003 PHARM REV CODE 250: Performed by: ADVANCED PRACTICE MIDWIFE

## 2019-03-19 PROCEDURE — 25000242 PHARM REV CODE 250 ALT 637 W/ HCPCS: Performed by: OBSTETRICS & GYNECOLOGY

## 2019-03-19 RX ORDER — GUAIFENESIN 600 MG/1
600 TABLET, EXTENDED RELEASE ORAL 2 TIMES DAILY
Status: DISCONTINUED | OUTPATIENT
Start: 2019-03-19 | End: 2019-03-20 | Stop reason: HOSPADM

## 2019-03-19 RX ORDER — BENZONATATE 100 MG/1
100 CAPSULE ORAL 3 TIMES DAILY PRN
Status: DISCONTINUED | OUTPATIENT
Start: 2019-03-19 | End: 2019-03-20 | Stop reason: HOSPADM

## 2019-03-19 RX ORDER — OXYCODONE AND ACETAMINOPHEN 5; 325 MG/1; MG/1
1 TABLET ORAL EVERY 4 HOURS PRN
Status: DISCONTINUED | OUTPATIENT
Start: 2019-03-19 | End: 2019-03-20 | Stop reason: HOSPADM

## 2019-03-19 RX ORDER — OXYCODONE AND ACETAMINOPHEN 10; 325 MG/1; MG/1
1 TABLET ORAL EVERY 4 HOURS PRN
Status: DISCONTINUED | OUTPATIENT
Start: 2019-03-19 | End: 2019-03-20 | Stop reason: HOSPADM

## 2019-03-19 RX ADMIN — HYDROCODONE BITARTRATE AND ACETAMINOPHEN 1 TABLET: 10; 325 TABLET ORAL at 03:03

## 2019-03-19 RX ADMIN — OXYCODONE HYDROCHLORIDE AND ACETAMINOPHEN 1 TABLET: 10; 325 TABLET ORAL at 11:03

## 2019-03-19 RX ADMIN — CHLORHEXIDINE GLUCONATE 0.12% ORAL RINSE 10 ML: 1.2 LIQUID ORAL at 08:03

## 2019-03-19 RX ADMIN — GUAIFENESIN AND DEXTROMETHORPHAN 5 ML: 100; 10 SYRUP ORAL at 12:03

## 2019-03-19 RX ADMIN — IBUPROFEN 600 MG: 600 TABLET ORAL at 12:03

## 2019-03-19 RX ADMIN — OXYCODONE HYDROCHLORIDE AND ACETAMINOPHEN 1 TABLET: 10; 325 TABLET ORAL at 04:03

## 2019-03-19 RX ADMIN — IBUPROFEN 600 MG: 600 TABLET ORAL at 05:03

## 2019-03-19 RX ADMIN — GUAIFENESIN 600 MG: 600 TABLET, EXTENDED RELEASE ORAL at 08:03

## 2019-03-19 RX ADMIN — IBUPROFEN 600 MG: 600 TABLET ORAL at 11:03

## 2019-03-19 RX ADMIN — OXYCODONE HYDROCHLORIDE AND ACETAMINOPHEN 1 TABLET: 5; 325 TABLET ORAL at 08:03

## 2019-03-19 RX ADMIN — OSELTAMIVIR PHOSPHATE 75 MG: 75 CAPSULE ORAL at 08:03

## 2019-03-19 RX ADMIN — HYDROXYZINE PAMOATE 50 MG: 50 CAPSULE ORAL at 12:03

## 2019-03-19 RX ADMIN — DOCUSATE SODIUM - SENNOSIDES 1 TABLET: 50; 8.6 TABLET, FILM COATED ORAL at 08:03

## 2019-03-19 RX ADMIN — IPRATROPIUM BROMIDE AND ALBUTEROL SULFATE 3 ML: .5; 3 SOLUTION RESPIRATORY (INHALATION) at 01:03

## 2019-03-19 RX ADMIN — PANTOPRAZOLE SODIUM 40 MG: 40 TABLET, DELAYED RELEASE ORAL at 08:03

## 2019-03-19 RX ADMIN — HYDROXYZINE PAMOATE 50 MG: 50 CAPSULE ORAL at 03:03

## 2019-03-19 RX ADMIN — OXYCODONE HYDROCHLORIDE AND ACETAMINOPHEN 1 TABLET: 10; 325 TABLET ORAL at 07:03

## 2019-03-19 RX ADMIN — IPRATROPIUM BROMIDE AND ALBUTEROL SULFATE 3 ML: .5; 3 SOLUTION RESPIRATORY (INHALATION) at 07:03

## 2019-03-19 NOTE — PROGRESS NOTES
[facility] -   Obstetrics  Postpartum Progress Note    Patient Name: Nena Nascimento  MRN: [id number]  Admission Date: 3/17/2019  Hospital Length of Stay: 2 days  Attending Physician: Allison Valencia MD  Primary Care Provider: GEMMA Calvo    Subjective:     Principal Problem:S/P  section    Hospital course: 3/17/19 1135 AM Admit, GBS neg, SROM @0940 this AM, clear fluid, desires TOLAC    2019 6:03 PM   Pt now with fever 101F. There is no progress of labor: cervix still 1cm and contractions are irregular. FHTs Cat 2: tachycardia with decreased variability.   With pt being remote from delivery, recommend  delivery. She agrees. R/a/b reviewed. Consents signed.        3/17/2019   Pre-operative Diagnosis:   2 previous uterine incision previous  delivery,  premature rupture of membranes, remote from delivery at 1cm cervical dilation, maternal fever, fetal tachycardia, (chorioamnionitis), decreased fetal heart tone reactivity  Post-operative Diagnosis: same  PROCEDURE: repeat low transverse  section  EBL 400mL, no complications    3/17-+influenza A, tamiflu started, droplet precautions started    Interval History:   Pod #2    She is doing well this morning--but continues to complain of non production cough. She is tolerating a regular diet without nausea or vomiting. She is voiding spontaneously. She is ambulating. She has passed flatus, and has not a BM. Vaginal bleeding is mild. She denies fever or chills--. Abdominal pain is moderate and controlled with oral medications. She is breastfeeding.    Objective:     Vital Signs (Most Recent):  Temp: 98.4 °F (36.9 °C) (19 0400)  Pulse: 86 (19 0729)  Resp: 18 (19 07)  BP: (!) 100/51 (19 0400)  SpO2: 99 % (19 07) Vital Signs (24h Range):  Temp:  [97.7 °F (36.5 °C)-98.8 °F (37.1 °C)] 98.4 °F (36.9 °C)  Pulse:  [] 86  Resp:  [18-20] 18  SpO2:  [97 %-100 %] 99 %  BP:  ()/(51-61) 100/51     Weight: 64 kg (141 lb)  Body mass index is 24.98 kg/m².      Intake/Output Summary (Last 24 hours) at 3/19/2019 0805  Last data filed at 3/18/2019 2200  Gross per 24 hour   Intake --   Output 800 ml   Net -800 ml       Significant Labs:  Lab Results   Component Value Date    GROUPTRH A POS 2019    HEPBSAG Negative 2018    STREPBCULT No Group B Streptococcus isolated 2019     Recent Labs   Lab 19  1140   HGB 10.8*   HCT 31.6*       I have personallly reviewed all pertinent lab results from the last 24 hours.  Recent Lab Results     None          Physical Exam:   Constitutional: She is oriented to person, place, and time. She appears well-developed.    HENT:   Head: Normocephalic.    Eyes: Pupils are equal, round, and reactive to light.    Neck: Normal range of motion.    Cardiovascular: Normal rate and regular rhythm.     Pulmonary/Chest: Effort normal and breath sounds normal. She has no wheezes.   Coarse breath sounds throughout        Abdominal: Soft. Bowel sounds are normal. She exhibits no abdominal incision (aquasel dressing intact, no surrounding errythema, exudate, induration).     Genitourinary:   Genitourinary Comments: Ut--fundus firm, non tender           Musculoskeletal: Normal range of motion. She exhibits no edema.       Neurological: She is alert and oriented to person, place, and time.    Skin: Skin is warm and dry.    Psychiatric: She has a normal mood and affect. Her behavior is normal. Judgment and thought content normal.       Assessment/Plan:     23 y.o. female  for:    * S/P  section    3/17/2019   Pre-operative Diagnosis:   2 previous uterine incision previous  delivery,  premature rupture of membranes, remote from delivery at 1cm cervical dilation, maternal fever, fetal tachycardia, (chorioamnionitis), decreased fetal heart tone reactivity  Post-operative Diagnosis: same  PROCEDURE: repeat low transverse   section  EBL 400mL, no complications  3  Pod #2  S/p rpeat ltcs for failed   Continue dietary advances, encouraged ambulation,   Pain medication changed to percocet; continue to alternate with ibuprofen  Aware ok to shower     Influenza A    Continue tamiflu 75mg twice daily, along with contact precautions.  mucinex and tessalon added     Chorioamnionitis in third trimester    S/p   24hours ampicillin, gentamicin, clindamycin postoperatively ; currently afebrile     Asthma    Albuterol inhaler PRN   Will continue nebulizer treatments.      Mild tetrahydrocannabinol (THC) abuse    UDS on admit          Disposition: As patient meets milestones, will plan to discharge in 2-3 days.    Jazmine Lucero MD  Obstetrics  [facility]

## 2019-03-19 NOTE — PLAN OF CARE
Problem: Adult Inpatient Plan of Care  Goal: Plan of Care Review  Outcome: Ongoing (interventions implemented as appropriate)  Patient progressing.  Pain well controlled with po pain medication.  Ambulating and voiding without difficulty.  Afebrile this shift.  Mom formula feeding and would like to breastfeed also.  Instructed to call for assistance if needed when baby shows feeding cues, verbalized understanding.  Aquacel remains clean, dry and intact.  VSS.

## 2019-03-19 NOTE — PLAN OF CARE
Problem: Adult Inpatient Plan of Care  Goal: Plan of Care Review  Outcome: Ongoing (interventions implemented as appropriate)  Will continue to monitor self care and care of infant.

## 2019-03-19 NOTE — SUBJECTIVE & OBJECTIVE
Hospital course: 3/17/19 1135 AM Admit, GBS neg, SROM @0940 this AM, clear fluid, desires TOLAC    2019 6:03 PM   Pt now with fever 101F. There is no progress of labor: cervix still 1cm and contractions are irregular. FHTs Cat 2: tachycardia with decreased variability.   With pt being remote from delivery, recommend  delivery. She agrees. R/a/b reviewed. Consents signed.        3/17/2019   Pre-operative Diagnosis:   2 previous uterine incision previous  delivery,  premature rupture of membranes, remote from delivery at 1cm cervical dilation, maternal fever, fetal tachycardia, (chorioamnionitis), decreased fetal heart tone reactivity  Post-operative Diagnosis: same  PROCEDURE: repeat low transverse  section  EBL 400mL, no complications    3/17-+influenza A, tamiflu started, droplet precautions started    Interval History:   Pod #2    She is doing well this morning--but continues to complain of non production cough. She is tolerating a regular diet without nausea or vomiting. She is voiding spontaneously. She is ambulating. She has passed flatus, and has not a BM. Vaginal bleeding is mild. She denies fever or chills--. Abdominal pain is moderate and controlled with oral medications. She is breastfeeding.    Objective:     Vital Signs (Most Recent):  Temp: 98.4 °F (36.9 °C) (19 0400)  Pulse: 86 (19 0729)  Resp: 18 (19 0729)  BP: (!) 100/51 (19 0400)  SpO2: 99 % (19 0729) Vital Signs (24h Range):  Temp:  [97.7 °F (36.5 °C)-98.8 °F (37.1 °C)] 98.4 °F (36.9 °C)  Pulse:  [] 86  Resp:  [18-20] 18  SpO2:  [97 %-100 %] 99 %  BP: ()/(51-61) 100/51     Weight: 64 kg (141 lb)  Body mass index is 24.98 kg/m².      Intake/Output Summary (Last 24 hours) at 3/19/2019 0805  Last data filed at 3/18/2019 2200  Gross per 24 hour   Intake --   Output 800 ml   Net -800 ml       Significant Labs:  Lab Results   Component Value Date    [facility] RAYSA POS 2019     HEPBSAG Negative 12/04/2018    STREPBCULT No Group B Streptococcus isolated 03/08/2019     Recent Labs   Lab 03/17/19  1140   HGB 10.8*   HCT 31.6*       I have personallly reviewed all pertinent lab results from the last 24 hours.  Recent Lab Results     None          Physical Exam:   Constitutional: She is oriented to person, place, and time. She appears well-developed.    HENT:   Head: Normocephalic.    Eyes: Pupils are equal, round, and reactive to light.    Neck: Normal range of motion.    Cardiovascular: Normal rate and regular rhythm.     Pulmonary/Chest: Effort normal and breath sounds normal. She has no wheezes.   Coarse breath sounds throughout        Abdominal: Soft. Bowel sounds are normal. She exhibits no abdominal incision (aquasel dressing intact, no surrounding errythema, exudate, induration).     Genitourinary:   Genitourinary Comments: Ut--fundus firm, non tender           Musculoskeletal: Normal range of motion. She exhibits no edema.       Neurological: She is alert and oriented to person, place, and time.    Skin: Skin is warm and dry.    Psychiatric: She has a normal mood and affect. Her behavior is normal. Judgment and thought content normal.

## 2019-03-19 NOTE — ASSESSMENT & PLAN NOTE
3/17/2019   Pre-operative Diagnosis:   2 previous uterine incision previous  delivery,  premature rupture of membranes, remote from delivery at 1cm cervical dilation, maternal fever, fetal tachycardia, (chorioamnionitis), decreased fetal heart tone reactivity  Post-operative Diagnosis: same  PROCEDURE: repeat low transverse  section  EBL 400mL, no complications  3/19/19  Pod #2  S/p rpeat ltcs for failed   Continue dietary advances, encouraged ambulation,   Pain medication changed to percocet; continue to alternate with ibuprofen  Aware ok to shower

## 2019-03-19 NOTE — PLAN OF CARE
Notified Dr. Valencia of pain and pain medication not working. New order to change norco to percocet.

## 2019-03-20 VITALS
RESPIRATION RATE: 18 BRPM | SYSTOLIC BLOOD PRESSURE: 98 MMHG | BODY MASS INDEX: 24.98 KG/M2 | DIASTOLIC BLOOD PRESSURE: 56 MMHG | TEMPERATURE: 98 F | HEART RATE: 65 BPM | OXYGEN SATURATION: 100 % | WEIGHT: 141 LBS | HEIGHT: 63 IN

## 2019-03-20 PROBLEM — O41.1230 CHORIOAMNIONITIS IN THIRD TRIMESTER: Status: RESOLVED | Noted: 2019-03-17 | Resolved: 2019-03-20

## 2019-03-20 PROCEDURE — 94640 AIRWAY INHALATION TREATMENT: CPT

## 2019-03-20 PROCEDURE — 25000242 PHARM REV CODE 250 ALT 637 W/ HCPCS: Performed by: OBSTETRICS & GYNECOLOGY

## 2019-03-20 PROCEDURE — 25000003 PHARM REV CODE 250: Performed by: OBSTETRICS & GYNECOLOGY

## 2019-03-20 PROCEDURE — 99238 HOSP IP/OBS DSCHRG MGMT 30/<: CPT | Mod: ,,, | Performed by: OBSTETRICS & GYNECOLOGY

## 2019-03-20 PROCEDURE — 99238 PR HOSPITAL DISCHARGE DAY,<30 MIN: ICD-10-PCS | Mod: ,,, | Performed by: OBSTETRICS & GYNECOLOGY

## 2019-03-20 PROCEDURE — 94760 N-INVAS EAR/PLS OXIMETRY 1: CPT

## 2019-03-20 RX ORDER — OSELTAMIVIR PHOSPHATE 75 MG/1
75 CAPSULE ORAL 2 TIMES DAILY
Qty: 4 CAPSULE | Refills: 0 | Status: SHIPPED | OUTPATIENT
Start: 2019-03-20 | End: 2019-03-22

## 2019-03-20 RX ORDER — OXYCODONE AND ACETAMINOPHEN 5; 325 MG/1; MG/1
1 TABLET ORAL EVERY 4 HOURS PRN
Qty: 15 TABLET | Refills: 0 | Status: SHIPPED | OUTPATIENT
Start: 2019-03-20 | End: 2019-04-22

## 2019-03-20 RX ORDER — IBUPROFEN 600 MG/1
600 TABLET ORAL EVERY 6 HOURS
Qty: 30 TABLET | Refills: 0 | Status: SHIPPED | OUTPATIENT
Start: 2019-03-20 | End: 2019-04-22

## 2019-03-20 RX ORDER — BENZONATATE 100 MG/1
100 CAPSULE ORAL 3 TIMES DAILY PRN
Qty: 30 CAPSULE | Refills: 0 | Status: SHIPPED | OUTPATIENT
Start: 2019-03-20 | End: 2019-03-30

## 2019-03-20 RX ADMIN — PANTOPRAZOLE SODIUM 40 MG: 40 TABLET, DELAYED RELEASE ORAL at 07:03

## 2019-03-20 RX ADMIN — OXYCODONE HYDROCHLORIDE AND ACETAMINOPHEN 1 TABLET: 5; 325 TABLET ORAL at 06:03

## 2019-03-20 RX ADMIN — DIPHENHYDRAMINE HYDROCHLORIDE 25 MG: 25 CAPSULE ORAL at 03:03

## 2019-03-20 RX ADMIN — IPRATROPIUM BROMIDE AND ALBUTEROL SULFATE 3 ML: .5; 3 SOLUTION RESPIRATORY (INHALATION) at 09:03

## 2019-03-20 RX ADMIN — OXYCODONE HYDROCHLORIDE AND ACETAMINOPHEN 1 TABLET: 5; 325 TABLET ORAL at 12:03

## 2019-03-20 RX ADMIN — IBUPROFEN 600 MG: 600 TABLET ORAL at 06:03

## 2019-03-20 RX ADMIN — HYDROXYZINE PAMOATE 50 MG: 50 CAPSULE ORAL at 02:03

## 2019-03-20 RX ADMIN — OSELTAMIVIR PHOSPHATE 75 MG: 75 CAPSULE ORAL at 09:03

## 2019-03-20 RX ADMIN — OXYCODONE HYDROCHLORIDE AND ACETAMINOPHEN 1 TABLET: 10; 325 TABLET ORAL at 10:03

## 2019-03-20 RX ADMIN — IBUPROFEN 600 MG: 600 TABLET ORAL at 12:03

## 2019-03-20 RX ADMIN — BENZONATATE 100 MG: 100 CAPSULE ORAL at 07:03

## 2019-03-20 RX ADMIN — GUAIFENESIN 600 MG: 600 TABLET, EXTENDED RELEASE ORAL at 07:03

## 2019-03-20 NOTE — ASSESSMENT & PLAN NOTE
S/p   24hours ampicillin, gentamicin, clindamycin postoperatively ; Fever may have been from influenza, has been afebrile for >48 hours

## 2019-03-20 NOTE — ASSESSMENT & PLAN NOTE
Pod #3  S/p repeat ltcs for failed   Pain control improved on percocet; continue to alternate with ibuprofen  Aware ok to shower  Appropriate for discharge today

## 2019-03-20 NOTE — NURSING
Discharge instructions read and given to patient. Verbalized understanding. Discharged to home via wheelchair with personal belongings accompanied by sig other.

## 2019-03-20 NOTE — PLAN OF CARE
"Problem: Adult Inpatient Plan of Care  Goal: Plan of Care Review  Outcome: Ongoing (interventions implemented as appropriate)  Mom states she is feeling better, vital signs are stable. Pain is controlled by pain meds. Mom bottle feed and decided to breast feed once as well as " my milk is in". Mom has been instructed that it may not be safe as she was +THC on admit. Will monitor.      "

## 2019-03-20 NOTE — SUBJECTIVE & OBJECTIVE
Hospital course: 3/17/19 1135 AM Admit, GBS neg, SROM @0940 this AM, clear fluid, desires TOLAC    2019 6:03 PM   Pt now with fever 101F. There is no progress of labor: cervix still 1cm and contractions are irregular. FHTs Cat 2: tachycardia with decreased variability.   With pt being remote from delivery, recommend  delivery. She agrees. R/a/b reviewed. Consents signed.        3/17/2019   Pre-operative Diagnosis:   2 previous uterine incision previous  delivery,  premature rupture of membranes, remote from delivery at 1cm cervical dilation, maternal fever, fetal tachycardia, (chorioamnionitis), decreased fetal heart tone reactivity  Post-operative Diagnosis: same  PROCEDURE: repeat low transverse  section  EBL 400mL, no complications    3/17-+influenza A, tamiflu started, droplet precautions started  Patient progressed well post operatively with expected symptoms of influenza but no post partum or post operative complications. She was appropriate for discharge POD3.    Interval History:     She is doing well this morning despite fatigue from the flu. She is tolerating a regular diet without nausea or vomiting. She is voiding spontaneously. She is ambulating. She has passed flatus, and has not a BM. Vaginal bleeding is mild. She denies fever or chills. Abdominal pain is mild and controlled with oral medications. She is not breastfeeding. She desires circumcision for her male baby: not applicable.    Objective:     Vital Signs (Most Recent):  Temp: 97.8 °F (36.6 °C) (19 0400)  Pulse: 65 (19 0900)  Resp: 18 (19 0900)  BP: 113/76 (19 0400)  SpO2: 100 % (19 0900) Vital Signs (24h Range):  Temp:  [97.4 °F (36.3 °C)-98.6 °F (37 °C)] 97.8 °F (36.6 °C)  Pulse:  [65-99] 65  Resp:  [17-20] 18  SpO2:  [100 %] 100 %  BP: ()/(51-76) 113/76     Weight: 64 kg (141 lb)  Body mass index is 24.98 kg/m².    No intake or output data in the 24 hours ending 19  0910    Significant Labs:  Lab Results   Component Value Date    GROUPTRH A POS 03/17/2019    HEPBSAG Negative 12/04/2018    STREPBCULT No Group B Streptococcus isolated 03/08/2019     No results for input(s): HGB, HCT in the last 48 hours.    I have personallly reviewed all pertinent lab results from the last 24 hours.    Physical Exam:   Constitutional: She is oriented to person, place, and time. She appears well-developed and well-nourished. No distress.       Cardiovascular: Normal rate, regular rhythm, normal heart sounds and intact distal pulses.    No murmur heard.   Pulmonary/Chest: Effort normal and breath sounds normal. No respiratory distress. She has no wheezes. She has no rales.        Abdominal: Soft. Bowel sounds are normal. She exhibits abdominal incision (Aquacel dressing in place, clear/dry/intact). She exhibits no distension. There is no tenderness. There is no guarding.   Uterine fundus firm, below umbilicus, mild tenderness (appropriate)             Musculoskeletal: Moves all extremeties. She exhibits no edema.   No calf tenderness       Neurological: She is alert and oriented to person, place, and time.    Skin: Skin is warm and dry. No rash noted. She is not diaphoretic.

## 2019-03-20 NOTE — PROGRESS NOTES
Patient's family member seen walking down the halls and going into the lobby without a facemask; he has been throwing up and coughing per patient and himself; patient has the flu, family member with flu. Re-educated patient and family that they are not allowed to leave their room without a facemask on--educated on the risks associated to flu exposure in newborns and others. Both had masks beside them in the bedroom but were not wearing them at this time. Handed them both clean masks and told them to put it on when leaving their room for any reason. Both verbalized understanding at this time. Notified House Supervisor. Will continue to monitor.

## 2019-03-20 NOTE — DISCHARGE SUMMARY
[facility] -   Obstetrics  Discharge Summary      Patient Name: Nena Nascimento  MRN: [id number]  Admission Date: 3/17/2019  Hospital Length of Stay: 3 days  Discharge Date and Time:  2019 9:26 AM  Attending Physician: Allison Valencia MD   Discharging Provider: Carolina Anaya PA-C  Primary Care Provider: GEMMA Calvo    HPI: 22yo  DUDLEY [birth year] EGA 36w3d arrived with SROM @ 0940 AM 3/17/19, clear fluid noted pooling, +valsalva and + nitrazine. Pt was late to care, previous c/s X 2, #1 for failed vac extraction after pushing, NRFHTs per pt, #2 scheduled repeat c/s. Pt is aware of uterine rupture rate, continuous EFM and Dr. Valencia readily available.       Procedure(s) (LRB):   SECTION (N/A)     Hospital Course:   3/17/19 1135 AM Admit, GBS neg, SROM @0940 this AM, clear fluid, desires TOLAC    2019 6:03 PM   Pt now with fever 101F. There is no progress of labor: cervix still 1cm and contractions are irregular. FHTs Cat 2: tachycardia with decreased variability.   With pt being remote from delivery, recommend  delivery. She agrees. R/a/b reviewed. Consents signed.        3/17/2019   Pre-operative Diagnosis:   2 previous uterine incision previous  delivery,  premature rupture of membranes, remote from delivery at 1cm cervical dilation, maternal fever, fetal tachycardia, (chorioamnionitis), decreased fetal heart tone reactivity  Post-operative Diagnosis: same  PROCEDURE: repeat low transverse  section  EBL 400mL, no complications    3/17-+influenza A, tamiflu started, droplet precautions started  Patient progressed well post operatively with expected symptoms of influenza but no post partum or post operative complications. She was appropriate for discharge POD3.    Consults (From admission, onward)        Status Ordering Provider     Pharmacy to dose Aminoglycosides consult  Once     Provider:  (Not yet assigned)    Acknowledged  ALLISON SINGH          Final Active Diagnoses:    Diagnosis Date Noted POA    PRINCIPAL PROBLEM:  S/P  section [Z98.891] 2019 Not Applicable    Influenza A [J10.1] 2019 Yes    Asthma [J45.909] 2019 Yes    Mild tetrahydrocannabinol (THC) abuse [F12.10] 2018 Yes      Problems Resolved During this Admission:    Diagnosis Date Noted Date Resolved POA    Amniotic fluid leaking [O42.90] 2019 Yes    Chorioamnionitis in third trimester [O41.1230] 2019 No    Failure to progress in labor [O62.2] 2019 No    Previous  section [Z98.891] 2018 Not Applicable        Labs: All labs within the past 24 hours have been reviewed    Feeding Method: bottle    Immunizations     Date Immunization Status Dose Route/Site Given by    19 0704 MMR Deferred 0.5 mL Subcutaneous/Left deltoid Cris Robb RN    19 0703 Tdap Deferred 0.5 mL Intramuscular/Left deltoid Cris Robb RN          Delivery:    Episiotomy: None   Lacerations: None   Repair suture: None   Repair # of packets:     Blood loss (ml): 0     Birth information:  YOB: 2021   Time of birth: 7:14 PM   Sex: female   Delivery type: , Low Transverse   Gestational Age: 36w3d    Delivery Clinician:      Other providers:       Additional  information:  Forceps:    Vacuum:    Breech:    Observed anomalies      Living?:           APGARS  One minute Five minutes Ten minutes   Skin color:         Heart rate:         Grimace:         Muscle tone:         Breathing:         Totals: 9  9        Placenta: Delivered:       appearance    Pending Diagnostic Studies:     None          Discharged Condition: good    Disposition: Home or Self Care    Follow Up:  Follow-up Information     Allison Singh MD In 4 weeks.    Specialties:  Gynecology, Obstetrics and Gynecology, Obstetrics  Why:  post op check  Contact information:  [id number]  [facility] [address]  [phone]             NURSE, OB-GYN In 1 week.    Specialty:  Obstetrics and Gynecology  Why:  For dressing removal               Patient Instructions:      Diet Adult Regular     Diet Adult Regular     Other restrictions (specify):   Order Comments: Pelvic rest x 6 weeks (no tampons, intercourse douching); showers only for 6 wks; no lifting more than baby     Call MD for:  temperature >100.4     Call MD for:  severe uncontrolled pain     Call MD for:  redness, tenderness, or signs of infection (pain, swelling, redness, odor or green/yellow discharge around incision site)     Call MD for:  difficulty breathing or increased cough     Call MD for:  severe persistent headache     Call MD for:  persistent dizziness, light-headedness, or visual disturbances     Leave dressing on - Keep it clean, dry, and intact until clinic visit   Order Comments: Dressing will be removed at 1 week nurse visit.  Showers only- no baths for 6 weeks.     Other restrictions (specify):   Order Comments: Pelvic rest x 6 weeks (no tampons, intercourse douching); showers only for 6 wks; no lifting more than baby     Call MD for:  temperature >100.4     Call MD for:  severe uncontrolled pain     Call MD for:  redness, tenderness, or signs of infection (pain, swelling, redness, odor or green/yellow discharge around incision site)     Call MD for:  difficulty breathing or increased cough     Call MD for:  severe persistent headache     Call MD for:  persistent dizziness, light-headedness, or visual disturbances     Leave dressing on - Keep it clean, dry, and intact until clinic visit   Order Comments: Dressing will be removed at 1 week nurse visit.  Showers only- no baths for 6 weeks.     Medications:  Current Discharge Medication List      START taking these medications    Details   benzonatate (TESSALON) 100 MG capsule Take 1 capsule (100 mg total) by mouth 3 (three) times daily as needed for Cough.  Qty:  30 capsule, Refills: 0      ibuprofen (ADVIL,MOTRIN) 600 MG tablet Take 1 tablet (600 mg total) by mouth every 6 (six) hours.  Qty: 30 tablet, Refills: 0      oseltamivir (TAMIFLU) 75 MG capsule Take 1 capsule (75 mg total) by mouth 2 (two) times daily. for 4 doses  Qty: 4 capsule, Refills: 0      oxyCODONE-acetaminophen (PERCOCET) 5-325 mg per tablet Take 1 tablet by mouth every 4 (four) hours as needed for Pain.  Qty: 15 tablet, Refills: 0         CONTINUE these medications which have NOT CHANGED    Details   albuterol (ACCUNEB) 0.63 mg/3 mL Nebu Take 0.63 mg by nebulization every 6 (six) hours as needed. Rescue      prenatal vit,jimmie 74-iron-folic 27 mg iron- 1 mg Tab Take by mouth.      fluticasone (FLONASE) 50 mcg/actuation nasal spray 2 sprays by Nasal route.         STOP taking these medications       acetaminophen (TYLENOL) 325 MG tablet Comments:   Reason for Stopping:         hydrOXYzine pamoate (VISTARIL) 25 MG Cap Comments:   Reason for Stopping:         montelukast (SINGULAIR) 10 mg tablet Comments:   Reason for Stopping:         pantoprazole (PROTONIX) 20 MG tablet Comments:   Reason for Stopping:               Carolina Anaya PA-C  Obstetrics [facility] -

## 2019-03-20 NOTE — PROGRESS NOTES
"Infant taken back to room and pt in bed crying. After telling her that her nurse is getting her discharge papers ready she states "I don't want to go home in this state of mind and no one has seen me this morning or asked me how I was doing. Aren't they going to prescribe me something because I already have postpartum depression and I think I need to be evaluated." As she is saying this she states "They told me my papers would be ready hours ago so why aren't they ready so I can leave?" Primary RN notified.  "

## 2019-03-20 NOTE — PROGRESS NOTES
[facility] -   Obstetrics  Postpartum Progress Note    Patient Name: Nena Nascimento  MRN: [id number]  Admission Date: 3/17/2019  Hospital Length of Stay: 3 days  Attending Physician: Allison Valencia MD  Primary Care Provider: GEMMA Calvo    Subjective:     Principal Problem:S/P  section    Hospital course: 3/17/19 1135 AM Admit, GBS neg, SROM @0940 this AM, clear fluid, desires TOLAC    2019 6:03 PM   Pt now with fever 101F. There is no progress of labor: cervix still 1cm and contractions are irregular. FHTs Cat 2: tachycardia with decreased variability.   With pt being remote from delivery, recommend  delivery. She agrees. R/a/b reviewed. Consents signed.        3/17/2019   Pre-operative Diagnosis:   2 previous uterine incision previous  delivery,  premature rupture of membranes, remote from delivery at 1cm cervical dilation, maternal fever, fetal tachycardia, (chorioamnionitis), decreased fetal heart tone reactivity  Post-operative Diagnosis: same  PROCEDURE: repeat low transverse  section  EBL 400mL, no complications    3/17-+influenza A, tamiflu started, droplet precautions started  Patient progressed well post operatively with expected symptoms of influenza but no post partum or post operative complications. She was appropriate for discharge POD3.    Interval History:     She is doing well this morning despite fatigue from the flu. She is tolerating a regular diet without nausea or vomiting. She is voiding spontaneously. She is ambulating. She has passed flatus, and has not a BM. Vaginal bleeding is mild. She denies fever or chills. Abdominal pain is mild and controlled with oral medications. She is not breastfeeding. She desires circumcision for her male baby: not applicable.    Objective:     Vital Signs (Most Recent):  Temp: 97.8 °F (36.6 °C) (19 0400)  Pulse: 65 (19 0900)  Resp: 18 (19 0900)  BP: 113/76 (19  0400)  SpO2: 100 % (19 0900) Vital Signs (24h Range):  Temp:  [97.4 °F (36.3 °C)-98.6 °F (37 °C)] 97.8 °F (36.6 °C)  Pulse:  [65-99] 65  Resp:  [17-20] 18  SpO2:  [100 %] 100 %  BP: ()/(51-76) 113/76     Weight: 64 kg (141 lb)  Body mass index is 24.98 kg/m².    No intake or output data in the 24 hours ending 19 0910    Significant Labs:  Lab Results   Component Value Date    GROUPTRH A POS 2019    HEPBSAG Negative 2018    STREPBCULT No Group B Streptococcus isolated 2019     No results for input(s): HGB, HCT in the last 48 hours.    I have personallly reviewed all pertinent lab results from the last 24 hours.    Physical Exam:   Constitutional: She is oriented to person, place, and time. She appears well-developed and well-nourished. No distress.       Cardiovascular: Normal rate, regular rhythm, normal heart sounds and intact distal pulses.    No murmur heard.   Pulmonary/Chest: Effort normal and breath sounds normal. No respiratory distress. She has no wheezes. She has no rales.        Abdominal: Soft. Bowel sounds are normal. She exhibits abdominal incision (Aquacel dressing in place, clear/dry/intact). She exhibits no distension. There is no tenderness. There is no guarding.   Uterine fundus firm, below umbilicus, mild tenderness (appropriate)             Musculoskeletal: Moves all extremeties. She exhibits no edema.   No calf tenderness       Neurological: She is alert and oriented to person, place, and time.    Skin: Skin is warm and dry. No rash noted. She is not diaphoretic.        Assessment/Plan:     23 y.o. female  for:    * S/P  section    Pod #3  S/p repeat ltcs for failed   Pain control improved on percocet; continue to alternate with ibuprofen  Aware ok to shower  Appropriate for discharge today      Influenza A    Continue tamiflu 75mg twice daily for full 5 day course.  Mucinex and tessalon prn     Chorioamnionitis in third trimester    S/p    24hours ampicillin, gentamicin, clindamycin postoperatively ; Fever may have been from influenza, has been afebrile for >48 hours     Asthma    Albuterol inhaler PRN   Will continue nebulizer treatments.      Mild tetrahydrocannabinol (THC) abuse    UDS on admit      Addendum: patient expressed to RN that she is upset that she hasn't been asked about her emotions post delivery. She admits that she didn't voice any of these concerns with the MD earlier this AM. States has irritability and short fuse but denies overwhelming feelings of sadness or hopelessness, states she looks forward to being home with baby. No ideation of self harm or harm to others. States feeling much better actually after having voiced her feelings to staff and myself which has normalized her experience. She is not interested in pharmacotherapy at this time for her symptoms. Encouraged patient to reach out for any worsening at any time and reinforced that depression screening will again take place at her post partum visit, however contact us earlier if needed. She expressed understanding and is eager for discharge home.    Disposition: As patient meets milestones, will plan to discharge today.    Carolina Anaya PA-C  Obstetrics  [facility]

## 2019-03-20 NOTE — NURSING
Patient complained of having post partum symptoms. Said she feels like isolating and that she's sad no one asked her how she feels. She's also upset her sig other left, but it sounds like her being upset made him want to leave. She said she doesn't feel like harming herself or baby and that if she ever did feel like that, she would tell someone. I did discuss PPD and how after delivery there is a lot of emotions that can come into play but shouldn't last longer than a couple weeks, and if it does to notify an MD. She said she still wants to go home and has help, but wants me to notify dr of her feelings, which I did. Awaiting response now. She did state she felt a little better after we talked.

## 2019-03-21 ENCOUNTER — TELEPHONE (OUTPATIENT)
Dept: OBSTETRICS AND GYNECOLOGY | Facility: CLINIC | Age: 24
End: 2019-03-21

## 2019-03-21 ENCOUNTER — TELEPHONE (OUTPATIENT)
Dept: OBSTETRICS AND GYNECOLOGY | Facility: HOSPITAL | Age: 24
End: 2019-03-21

## 2019-03-21 NOTE — TELEPHONE ENCOUNTER
Pt called stating she just had a . She c/o swelling in her hands, feet, face, and experiencing chest pain. Pt stated she is unable to walk and cannot close her hand due to excessive swelling. I asked if someone can assist her to the ER. She said her  is sick too and unable to drive her. Instructed pt to call EMS to transport her to [facility] ER for further evaluation and treatment. Pt. verbalized understanding.

## 2019-03-27 ENCOUNTER — TELEPHONE (OUTPATIENT)
Dept: OBSTETRICS AND GYNECOLOGY | Facility: CLINIC | Age: 24
End: 2019-03-27

## 2019-03-27 DIAGNOSIS — R30.0 DYSURIA: Primary | ICD-10-CM

## 2019-03-27 NOTE — TELEPHONE ENCOUNTER
Pt c/o pain with urination, urinary frequency and pelvic pain.  Per Dr. Alvarado, pt advised to come to [facility] location for u/a, will call her w/results as soon as they are available.  Pt also advised to increase hydration, take tylenol, call or go to ER for worsening symptoms.  Pt voiced understanding.  VB, LPN

## 2019-03-29 ENCOUNTER — LAB VISIT (OUTPATIENT)
Dept: LAB | Facility: HOSPITAL | Age: 24
End: 2019-03-29
Attending: OBSTETRICS & GYNECOLOGY
Payer: MEDICAID

## 2019-03-29 DIAGNOSIS — R30.0 DYSURIA: ICD-10-CM

## 2019-03-29 LAB
BACTERIA #/AREA URNS AUTO: ABNORMAL /HPF
BILIRUB UR QL STRIP: NEGATIVE
CLARITY UR REFRACT.AUTO: ABNORMAL
COLOR UR AUTO: YELLOW
GLUCOSE UR QL STRIP: NEGATIVE
HGB UR QL STRIP: ABNORMAL
KETONES UR QL STRIP: NEGATIVE
LEUKOCYTE ESTERASE UR QL STRIP: ABNORMAL
MICROSCOPIC COMMENT: ABNORMAL
NITRITE UR QL STRIP: NEGATIVE
PH UR STRIP: 5 [PH] (ref 5–8)
PROT UR QL STRIP: NEGATIVE
RBC #/AREA URNS AUTO: 44 /HPF (ref 0–4)
SP GR UR STRIP: 1.02 (ref 1–1.03)
SQUAMOUS #/AREA URNS AUTO: 3 /HPF
URN SPEC COLLECT METH UR: ABNORMAL
WBC #/AREA URNS AUTO: >100 /HPF (ref 0–5)
WBC CLUMPS UR QL AUTO: ABNORMAL

## 2019-03-29 PROCEDURE — 81001 URINALYSIS AUTO W/SCOPE: CPT

## 2019-04-01 ENCOUNTER — TELEPHONE (OUTPATIENT)
Dept: OBSTETRICS AND GYNECOLOGY | Facility: CLINIC | Age: 24
End: 2019-04-01

## 2019-04-01 NOTE — TELEPHONE ENCOUNTER
Returned pt call. Advised pt we have received the results, but they have not been reviewed or released by the doctor yet and as soon as they are reviewed I will give her a call with the results. Pt verbalized understanding.

## 2019-04-01 NOTE — TELEPHONE ENCOUNTER
----- Message from Vera Lane sent at 4/1/2019  2:07 PM CDT -----  Contact: Patient  Type:  Test Results    Who Called: Patient  Name of Test (Lab/Mammo/Etc): Urine  Date of Test: 3/29/19  Ordering Provider: Dr Valencia  Where the test was performed:   Would the patient rather a call back or a response via [facility]? call  Best Call Back Number: [phone]  Additional Information:

## 2019-04-02 ENCOUNTER — TELEPHONE (OUTPATIENT)
Dept: OBSTETRICS AND GYNECOLOGY | Facility: CLINIC | Age: 24
End: 2019-04-02

## 2019-04-02 NOTE — TELEPHONE ENCOUNTER
----- Message from Sekou Agustin sent at 4/2/2019 11:39 AM CDT -----  Contact: [phone]   Pt would like to know test results.  Please call back at [phone].  x-

## 2019-04-02 NOTE — TELEPHONE ENCOUNTER
Returned pt call. Advised pt I would call her first thing in the morning once doctor pamela reviews them tomorrow. Pt verbalized understanding.

## 2019-04-03 ENCOUNTER — TELEPHONE (OUTPATIENT)
Dept: OBSTETRICS AND GYNECOLOGY | Facility: CLINIC | Age: 24
End: 2019-04-03

## 2019-04-03 RX ORDER — PHENAZOPYRIDINE HYDROCHLORIDE 200 MG/1
200 TABLET, FILM COATED ORAL 2 TIMES DAILY
Qty: 14 TABLET | Refills: 1 | Status: SHIPPED | OUTPATIENT
Start: 2019-04-03 | End: 2019-04-10

## 2019-04-03 RX ORDER — NITROFURANTOIN 25; 75 MG/1; MG/1
100 CAPSULE ORAL 2 TIMES DAILY
Qty: 14 CAPSULE | Refills: 0 | Status: SHIPPED | OUTPATIENT
Start: 2019-04-03 | End: 2019-04-10

## 2019-04-03 NOTE — TELEPHONE ENCOUNTER
I spoke to pt and called macrobid and pyridium in for her    pls add comment to check u/a at 4/22 visit

## 2019-04-22 ENCOUNTER — POSTPARTUM VISIT (OUTPATIENT)
Dept: OBSTETRICS AND GYNECOLOGY | Facility: CLINIC | Age: 24
End: 2019-04-22
Payer: MEDICAID

## 2019-04-22 VITALS
WEIGHT: 132.25 LBS | SYSTOLIC BLOOD PRESSURE: 98 MMHG | DIASTOLIC BLOOD PRESSURE: 62 MMHG | HEIGHT: 63 IN | BODY MASS INDEX: 23.43 KG/M2

## 2019-04-22 DIAGNOSIS — R31.9 HEMATURIA, UNSPECIFIED TYPE: ICD-10-CM

## 2019-04-22 LAB
BACTERIA #/AREA URNS AUTO: ABNORMAL /HPF
BILIRUB UR QL STRIP: NEGATIVE
CLARITY UR REFRACT.AUTO: ABNORMAL
COLOR UR AUTO: YELLOW
GLUCOSE UR QL STRIP: NEGATIVE
HGB UR QL STRIP: ABNORMAL
KETONES UR QL STRIP: NEGATIVE
LEUKOCYTE ESTERASE UR QL STRIP: ABNORMAL
MICROSCOPIC COMMENT: ABNORMAL
NITRITE UR QL STRIP: NEGATIVE
NON-SQ EPI CELLS #/AREA URNS AUTO: <1 /HPF
PH UR STRIP: 6 [PH] (ref 5–8)
PROT UR QL STRIP: NEGATIVE
RBC #/AREA URNS AUTO: >100 /HPF (ref 0–4)
SP GR UR STRIP: 1.02 (ref 1–1.03)
SQUAMOUS #/AREA URNS AUTO: 14 /HPF
URN SPEC COLLECT METH UR: ABNORMAL
WBC #/AREA URNS AUTO: 14 /HPF (ref 0–5)

## 2019-04-22 PROCEDURE — 99213 OFFICE O/P EST LOW 20 MIN: CPT | Mod: PBBFAC | Performed by: OBSTETRICS & GYNECOLOGY

## 2019-04-22 PROCEDURE — 88175 CYTOPATH C/V AUTO FLUID REDO: CPT

## 2019-04-22 PROCEDURE — 87086 URINE CULTURE/COLONY COUNT: CPT

## 2019-04-22 PROCEDURE — 99999 PR PBB SHADOW E&M-EST. PATIENT-LVL III: ICD-10-PCS | Mod: PBBFAC,,, | Performed by: OBSTETRICS & GYNECOLOGY

## 2019-04-22 PROCEDURE — 87088 URINE BACTERIA CULTURE: CPT

## 2019-04-22 PROCEDURE — 99999 PR PBB SHADOW E&M-EST. PATIENT-LVL III: CPT | Mod: PBBFAC,,, | Performed by: OBSTETRICS & GYNECOLOGY

## 2019-04-22 PROCEDURE — 96372 THER/PROPH/DIAG INJ SC/IM: CPT | Mod: PBBFAC

## 2019-04-22 PROCEDURE — 81001 URINALYSIS AUTO W/SCOPE: CPT

## 2019-04-22 PROCEDURE — 87147 CULTURE TYPE IMMUNOLOGIC: CPT

## 2019-04-22 RX ORDER — MEDROXYPROGESTERONE ACETATE 150 MG/ML
150 INJECTION, SUSPENSION INTRAMUSCULAR
Status: DISCONTINUED | OUTPATIENT
Start: 2019-04-22 | End: 2019-11-25

## 2019-04-22 RX ADMIN — MEDROXYPROGESTERONE ACETATE 150 MG: 150 INJECTION, SUSPENSION INTRAMUSCULAR at 04:04

## 2019-04-22 NOTE — PROGRESS NOTES
CC: Post-partum follow-up    Nean Nascimento is a 24 y.o. female   who presents for post-partum visit.    She and the baby are doing well.  No pain.  No fever.   No bowel / bladder complaints.    Delivery note reviewed:     3/17/2019   Pre-operative Diagnosis:   2 previous uterine incision previous  delivery,  premature rupture of membranes, remote from delivery at 1cm cervical dilation, maternal fever, fetal tachycardia, (chorioamnionitis), decreased fetal heart tone reactivity  Post-operative Diagnosis: same  PROCEDURE: repeat low transverse  section        Breast feeding: yes     Birth control: She is not yet sexually active.  Contraception:  Depo provera  Depression: no    Good family support: yes     Prenatal Record:  Gestational diabetes: none     Recent Hct:   Lab Results   Component Value Date    WBC 8.93 2019    HGB 12.2 2019    HCT 35.6 (L) 2019    MCV 91 2019     2019         Pap: normal within 3 years   GYN screening history: last Pap: was normal. Never had any abnormal Pap smears in past.    She has no ocrisks.       Health Maintenance   Topic Date Due    Lipid Panel  1993    HPV Vaccines (1 - Female 3-dose series) 2008    Pap Smear  2014    Influenza Vaccine  2018    TETANUS VACCINE  2029       HISTORY  Patient Active Problem List   Diagnosis    Mild tetrahydrocannabinol (THC) abuse    Encounter for supervision of normal pregnancy in multigravida    Asthma    S/P  section    Influenza A       Past Medical History:   Diagnosis Date    Asthma     Previous  section 2018    #1 arrest of labor second stage, failed vac ext. LTCS. Op note in media. #2 repeat. al       Past Surgical History:   Procedure Laterality Date     SECTION       SECTION N/A 3/17/2019    Performed by Allison Valencia MD at [facility]    WISDOM TOOTH EXTRACTION         Family History    Problem Relation Age of Onset    Breast cancer Maternal Aunt     Ovarian cancer Neg Hx     Colon cancer Neg Hx        Social History     Socioeconomic History    Marital status:      Spouse name: Not on file    Number of children: Not on file    Years of education: Not on file    Highest education level: Not on file   Occupational History    Not on file   Social Needs    Financial resource strain: Not on file    Food insecurity:     Worry: Not on file     Inability: Not on file    Transportation needs:     Medical: Not on file     Non-medical: Not on file   Tobacco Use    Smoking status: Never Smoker    Smokeless tobacco: Never Used   Substance and Sexual Activity    Alcohol use: No     Frequency: Never    Drug use: Yes     Frequency: 1.0 times per week     Types: Marijuana     Comment: stopped during pregnancy     Sexual activity: Yes     Partners: Male     Birth control/protection: None   Lifestyle    Physical activity:     Days per week: Not on file     Minutes per session: Not on file    Stress: Not on file   Relationships    Social connections:     Talks on phone: Not on file     Gets together: Not on file     Attends [employer] service: Not on file     Active member of club or organization: Not on file     Attends meetings of clubs or organizations: Not on file     Relationship status: Not on file   Other Topics Concern    Not on file   Social History Narrative    Not on file       Current Outpatient Medications   Medication Sig Dispense Refill    albuterol (ACCUNEB) 0.63 mg/3 mL Nebu Take 0.63 mg by nebulization every 6 (six) hours as needed. Rescue      fluticasone (FLONASE) 50 mcg/actuation nasal spray 2 sprays by Nasal route.       No current facility-administered medications for this visit.        Review of patient's allergies indicates:   Allergen Reactions    Latex, natural rubber            PHYSICAL EXAM     Vitals:    04/22/19 1532   BP: 98/62       PAIN SCALE: 0/10  None    ROS:  GENERAL: No fever, chills, fatigability or weight loss.  ABDOMEN: Appetite fine. No weight loss. Denies diarrhea, abdominal pain, hematemesis or blood in stool.  No change in bowel movement pattern.  URINARY: No flank pain, dysuria or hematuria.  REPRODUCTIVE: No abnormal vaginal bleeding.  BREASTS: Breasts symmetric, nontender and no lumps detected.    PE:   APPEARANCE: Well nourished, well developed, in no acute distress.  NECK: Neck symmetric without masses or thyromegaly.  NODES: No inguinal lymph node enlargement.  ABDOMEN: Soft. No tenderness or masses. No hepatosplenomegaly. No hernias.  Inc CDI    BREASTS: Symmetrical, no skin changes or visible lesions. No palpable masses, nipple discharge or adenopathy bilaterally.    PELVIC:   VULVA: No lesions. Normal female genitalia.  URETHRAL MEATUS: Normal size and location, no lesions, no prolapse.  URETHRA: No masses, tenderness, prolapse or scarring.  VAGINA: Moist and well rugated, no discharge, no significant cystocele or rectocele.  CERVIX: No lesions, normal diameter, no stenosis, no cervical motion tenderness.  UTERUS: 8-10 week size, regular shape, mobile, non-tender, normal position, good support.  ADNEXA: No masses, tenderness or CDS nodularity.  ANUS PERINEUM: No lesions, no relaxation, no external hemorrhoids.  RECTUM: no masses    DIAGNOSIS:   1. Normal Postpartum exam  2. Screening pap smear     3. Contraception counseling      UPT neg    PLAN:   May resume normal activities  Repeat u/a to sshow resolution of hematuria     MEDICATIONS PRESCRIBED:   PNV    Depo provera 150mg IM q3mo    COUNSELING:  Patient was counseled today on A.C.S. Pap guidelines and recommendations for yearly pelvic exams, mammograms and monthly self breast exams; to see her PCP for other health maintenance.     Reviewed all forms of contraception with patient including pills, patch, ring, Depo Provera, Nexplanon, Paraguard, and Mirena. The use of contraceptive  options has been fully discussed with the patient. This includes the proper method to initiate and continue the pills, the need for regular compliance to ensure adequate contraceptive effect, the physiology which make the contraception effective, the instructions for what to do in event of a missed pill/ring/strings, and warnings about anticipated minor side effects such as breakthrough spotting, nausea, breast tenderness, weight changes, acne, headaches, etc.  She has been told of the more serious potential side effects such as MI, stroke, and deep vein thrombosis.  She has been asked to report any signs of such serious problems immediately.  She should back with a condom during the first month of use of any new contraception method. The need for additional protection, such as a condom, to prevent exposure to sexually transmitted diseases has also been discussed. She understands and wishes to take the congtraception as prescribed.      FOLLOW-UP: With me in 1 year. Pap smear every 3 years.

## 2019-04-23 ENCOUNTER — TELEPHONE (OUTPATIENT)
Dept: OBSTETRICS AND GYNECOLOGY | Facility: CLINIC | Age: 24
End: 2019-04-23

## 2019-04-23 LAB — BACTERIA UR CULT: NORMAL

## 2019-04-23 NOTE — TELEPHONE ENCOUNTER
----- Message from Juan Robbins sent at 4/23/2019  3:05 PM CDT -----  Contact: pt   Pt needs approval to return to work in writing to give to employer.      ..[phone] (home)

## 2019-04-23 NOTE — LETTER
[facility]  [address] [facility] [address]  Phone: [phone]  Fax: [phone]       Patient: Nena aNscimento   YOB: 1995  Date of Visit: 04/22/2019    To Whom It May Concern:    Arnulfo Nascimento  was at [facility] on 04/22/2019. She may return to work on 04/26/2019 with no restrictions. If you have any questions or concerns, or if I can be of further assistance, please do not hesitate to contact me.    Sincerely,          Allison Valencia MD

## 2019-04-25 ENCOUNTER — TELEPHONE (OUTPATIENT)
Dept: OBSTETRICS AND GYNECOLOGY | Facility: HOSPITAL | Age: 24
End: 2019-04-25

## 2019-04-25 DIAGNOSIS — N30.01 ACUTE CYSTITIS WITH HEMATURIA: ICD-10-CM

## 2019-04-25 RX ORDER — CEPHALEXIN 500 MG/1
500 CAPSULE ORAL EVERY 8 HOURS
Qty: 9 CAPSULE | Refills: 0 | Status: SHIPPED | OUTPATIENT
Start: 2019-04-25 | End: 2019-04-28

## 2019-04-25 NOTE — TELEPHONE ENCOUNTER
Patient with hematuria and GBBS colonization of urine.  Will treat for UTI.  Please notify.  Rx sent to pharmacy.

## 2019-04-29 ENCOUNTER — TELEPHONE (OUTPATIENT)
Dept: OBSTETRICS AND GYNECOLOGY | Facility: CLINIC | Age: 24
End: 2019-04-29

## 2019-04-29 NOTE — TELEPHONE ENCOUNTER
Spoke to patient, notified of results. Patient verbalized understanding, stated she picked up the rx this morning.

## 2019-04-30 ENCOUNTER — TELEPHONE (OUTPATIENT)
Dept: OBSTETRICS AND GYNECOLOGY | Facility: CLINIC | Age: 24
End: 2019-04-30

## 2019-04-30 NOTE — TELEPHONE ENCOUNTER
----- Message from Allison Valencia MD sent at 4/30/2019  9:08 AM CDT -----  Please notify patient her Pap smear is normal.   I recommend she repeats her annual exam in one year.

## 2019-05-07 ENCOUNTER — TELEPHONE (OUTPATIENT)
Dept: OBSTETRICS AND GYNECOLOGY | Facility: CLINIC | Age: 24
End: 2019-05-07

## 2019-05-07 NOTE — TELEPHONE ENCOUNTER
"Spoke to patient and she stated she postpartum and has had her postpartum visit already, but feels like she cannot handle things right now (anxious - driving, dealing with family issues, etc.).  Patient denies desire to harm self or children stating "it's not that bad, I just wanted to get something to help before it gets bad".    Explained to patient that message would be sent to Dr. Valencia and patient verbalized understanding.  "

## 2019-05-07 NOTE — TELEPHONE ENCOUNTER
----- Message from Bronwyn Osmel sent at 5/7/2019 12:15 PM CDT -----  Contact: pt  1. What is the name of the medication you are requesting? Anxiety  2. What is the dose? n/a  3. How do you take the medication? Orally, topically, etc? Orally  4. How often do you take this medication? n/a  5. Do you need a 30 day or 90 day supply? n/a  6. How many refills are you requesting? n/a  7. What is your preferred pharmacy and location of the pharmacy? [facility]  8. Who can we contact with further questions? The pt at [phone]

## 2019-05-08 RX ORDER — SERTRALINE HYDROCHLORIDE 25 MG/1
25 TABLET, FILM COATED ORAL DAILY
Qty: 30 TABLET | Refills: 5 | Status: SHIPPED | OUTPATIENT
Start: 2019-05-08 | End: 2019-11-25

## 2019-05-08 NOTE — TELEPHONE ENCOUNTER
Spoke w pt. She is showing classic signs of overwhelment anxiety and depression. However no HI SI and has a great family support.   rec also good food and exercise regimen.  She is not breastfeedeing. No bipolar or ADHD.   She would like to try an antidepressant.   Discussed starting zoloft. R/a/b.  reviewed w caution of small risk of worsening depression w SI HI - and if so to stop med and call us asap.   Pt verbalized understanding and desires to start. zoloft 25mg called in since she is small BMI.     Will f/u in 2-3wks in clinic.   Recommended for  counselor or therapist - she will ck w insurance to see what is covered.          My nurses: Please call to make a f/u appt w me in 5-6wks

## 2019-07-23 ENCOUNTER — HOSPITAL ENCOUNTER (EMERGENCY)
Facility: HOSPITAL | Age: 24
Discharge: HOME | End: 2019-07-23
Attending: EMERGENCY MEDICINE
Payer: MEDICAID

## 2019-07-23 VITALS
OXYGEN SATURATION: 98 % | RESPIRATION RATE: 17 BRPM | BODY MASS INDEX: 23.27 KG/M2 | WEIGHT: 131.31 LBS | TEMPERATURE: 98 F | SYSTOLIC BLOOD PRESSURE: 116 MMHG | DIASTOLIC BLOOD PRESSURE: 71 MMHG | HEART RATE: 85 BPM | HEIGHT: 63 IN

## 2019-07-23 DIAGNOSIS — L25.9 CONTACT DERMATITIS, UNSPECIFIED CONTACT DERMATITIS TYPE, UNSPECIFIED TRIGGER: Primary | ICD-10-CM

## 2019-07-23 LAB
HIV 1+2 AB+HIV1 P24 AG SERPL QL IA: NEGATIVE
RPR SER QL: NORMAL

## 2019-07-23 PROCEDURE — 25000003 PHARM REV CODE 250: Performed by: NURSE PRACTITIONER

## 2019-07-23 PROCEDURE — 99284 EMERGENCY DEPT VISIT MOD MDM: CPT | Mod: 25

## 2019-07-23 PROCEDURE — 96372 THER/PROPH/DIAG INJ SC/IM: CPT

## 2019-07-23 PROCEDURE — 86592 SYPHILIS TEST NON-TREP QUAL: CPT

## 2019-07-23 PROCEDURE — 86703 HIV-1/HIV-2 1 RESULT ANTBDY: CPT

## 2019-07-23 PROCEDURE — 63600175 PHARM REV CODE 636 W HCPCS: Mod: JG | Performed by: NURSE PRACTITIONER

## 2019-07-23 RX ORDER — HYDROCORTISONE 25 MG/ML
LOTION TOPICAL 2 TIMES DAILY
Qty: 60 ML | Refills: 0 | Status: SHIPPED | OUTPATIENT
Start: 2019-07-23 | End: 2019-11-25

## 2019-07-23 RX ORDER — DIPHENHYDRAMINE HCL 25 MG
50 CAPSULE ORAL EVERY 6 HOURS PRN
Qty: 20 CAPSULE | Refills: 0 | Status: SHIPPED | OUTPATIENT
Start: 2019-07-23

## 2019-07-23 RX ORDER — FAMOTIDINE 20 MG/1
40 TABLET, FILM COATED ORAL DAILY
Qty: 10 TABLET | Refills: 0 | Status: SHIPPED | OUTPATIENT
Start: 2019-07-23 | End: 2019-11-25

## 2019-07-23 RX ORDER — ACETAMINOPHEN 325 MG/1
650 TABLET ORAL
Status: COMPLETED | OUTPATIENT
Start: 2019-07-23 | End: 2019-07-23

## 2019-07-23 RX ORDER — PREDNISONE 50 MG/1
50 TABLET ORAL DAILY
Qty: 5 TABLET | Refills: 0 | Status: SHIPPED | OUTPATIENT
Start: 2019-07-23 | End: 2019-07-28

## 2019-07-23 RX ADMIN — PENICILLIN G BENZATHINE 2.4 MILLION UNITS: 2400000 INJECTION, SUSPENSION INTRAMUSCULAR at 06:07

## 2019-07-23 RX ADMIN — ACETAMINOPHEN 650 MG: 325 TABLET ORAL at 06:07

## 2019-07-23 NOTE — ED PROVIDER NOTES
SCRIBE #1 NOTE: I, Lucia Woodruffsidney, am scribing for, and in the presence of, Juany Quarles NP. I have scribed the entire note.       History     Chief Complaint   Patient presents with    Rash     reports having round rash to palms, forearms, thighs, reports itchy as well      Review of patient's allergies indicates:   Allergen Reactions    Latex, natural rubber          History of Present Illness     HPI    2019, 5:52 PM  History obtained from the patient      History of Present Illness: Nena Nascimento is a 24 y.o. female patient who presents to the Emergency Department for evaluation of erythematous, raised rash which onset gradually x2 days ago. Pt reports she first noticed to rash to her bilat palms and forearms. Pt noticed the rash to her bilat inner thighs today and was scared it may be a sign of an STD. Pt states the rash is itchy. Symptoms are constant and moderate in severity. No mitigating or exacerbating factors reported. Associated sxs include appetite loss. Patient denies any fever, abd pain, chills, numbness, weakness, n/v/d, CP, SOB, trouble swallowing, and all other sxs at this time. Prior Tx includes triple abx cream with no relief. No further complaints or concerns at this time.       Arrival mode: Personal vehicle    PCP: GEMMA Calvo        Past Medical History:  Past Medical History:   Diagnosis Date    Asthma     Previous  section 2018    #1 arrest of labor second stage, failed vac ext. LTCS. Op note in media. #2 repeat. al       Past Surgical History:  Past Surgical History:   Procedure Laterality Date     SECTION       SECTION N/A 3/17/2019    Performed by Allison Valencia MD at [facility] L&D    WISDOM TOOTH EXTRACTION           Family History:  Family History   Problem Relation Age of Onset    Breast cancer Maternal Aunt     Ovarian cancer Neg Hx     Colon cancer Neg Hx        Social History:  Social History     Tobacco Use    Smoking  status: Never Smoker    Smokeless tobacco: Never Used   Substance and Sexual Activity    Alcohol use: No     Frequency: Never    Drug use: Yes     Frequency: 1.0 times per week     Types: Marijuana     Comment: stopped during pregnancy     Sexual activity: Yes     Partners: Male     Birth control/protection: None        Review of Systems     Review of Systems   Constitutional: Positive for appetite change (loss). Negative for chills and fever.   HENT: Negative for sore throat and trouble swallowing.    Respiratory: Negative for shortness of breath.    Cardiovascular: Negative for chest pain.   Gastrointestinal: Negative for abdominal pain, diarrhea, nausea and vomiting.   Genitourinary: Negative for dysuria.   Musculoskeletal: Negative for back pain.   Skin: Positive for rash (erythematous, raised to bilat palms, bilat forearms and bilat inner thighs).        (+) itchiness to rash   Neurological: Negative for weakness and numbness.   Hematological: Does not bruise/bleed easily.   All other systems reviewed and are negative.     Physical Exam     Initial Vitals [07/23/19 1717]   BP Pulse Resp Temp SpO2   116/71 85 17 98.4 °F (36.9 °C) 98 %      MAP       --          Physical Exam  Nursing Notes and Vital Signs Reviewed.  Constitutional: Patient is in no acute distress. Well-developed and well-nourished.  Head: Atraumatic. Normocephalic.  Eyes: PERRL. EOM intact. Conjunctivae are not pale. No scleral icterus.  ENT: Mucous membranes are moist. Oropharynx is clear and symmetric.    Neck: Supple. Full ROM. No lymphadenopathy.  Cardiovascular: Regular rate. Regular rhythm. No murmurs, rubs, or gallops. Distal pulses are 2+ and symmetric.  Pulmonary/Chest: No respiratory distress. Clear to auscultation bilaterally. No wheezing or rales.  Abdominal: Soft and non-distended.  There is no tenderness.  No rebound, guarding, or rigidity. Good bowel sounds.  Genitourinary: No CVA tenderness  Musculoskeletal: Moves all  "extremities. No obvious deformities. No edema. No calf tenderness.  Skin: Warm and dry. Puritic rash to bilat palms and inner thighs. 0.5 cm reddish/brown papules. No drainage.   Neurological:  Alert, awake, and appropriate.  Normal speech.  No acute focal neurological deficits are appreciated.     ED Course   Procedures  ED Vital Signs:  Vitals:    07/23/19 1717   BP: 116/71   Pulse: 85   Resp: 17   Temp: 98.4 °F (36.9 °C)   TempSrc: Oral   SpO2: 98%   Weight: 59.5 kg (131 lb 4.5 oz)   Height: 5' 3" (1.6 m)       Abnormal Lab Results:  Labs Reviewed   HIV 1 / 2 ANTIBODY   RPR        Imaging Results:  Imaging Results    None               The Emergency Provider reviewed the vital signs and test results, which are outlined above.     ED Discussion     5:45PM: D/w pt will test RPR to rule out syphilis.  The patient opted for prophylactic treatment while in the emergency department.  Patient states she will obtain results through my chart are with her primary care within the next 24 hr.  Patient informed if RPR reactive she will need to notify all partners for the last 90 days for testing and treatment.  Patient verbalized agreement and understanding of treatment plan.    6:00 PM: Assessed pt at this time. Discussed with pt all pertinent ED information and results. Discussed pt dx and plan of tx. Gave pt all f/u and return to the ED instructions. All questions and concerns were addressed at this time. Pt expresses understanding of information and instructions, and is comfortable with plan to discharge. Pt is stable for discharge.    I discussed with patient and/or family/caretaker that evaluation in the ED does not suggest any emergent or life threatening medical conditions requiring immediate intervention beyond what was provided in the ED, and I believe patient is safe for discharge.  Regardless, an unremarkable evaluation in the ED does not preclude the development or presence of a serious of life threatening " condition. As such, patient was instructed to return immediately for any worsening or change in current symptoms.      ED Medication(s):  Medications   penicillin G benzathine (BICILLIN LA) injection 2.4 Million Units (has no administration in time range)       New Prescriptions    DIPHENHYDRAMINE (BENADRYL) 25 MG CAPSULE    Take 2 capsules (50 mg total) by mouth every 6 (six) hours as needed for Itching.    FAMOTIDINE (PEPCID) 20 MG TABLET    Take 2 tablets (40 mg total) by mouth once daily. for 5 days    HYDROCORTISONE 2.5 % LOTION    Apply topically 2 (two) times daily.    PREDNISONE (DELTASONE) 50 MG TAB    Take 1 tablet (50 mg total) by mouth once daily. for 5 days     .edme  Follow-up Information     GEMMA Calvo In 2 days.    Specialty:  Family Medicine  Why:  Follow up with your doctor for further evaluation, Return to ED for any concerns.  Contact information:  [address] [place] [address] [phone]                                     Scribe Attestation:   Scribe #1: I performed the above scribed service and the documentation accurately describes the services I performed. I attest to the accuracy of the note.     Attending:   Physician Attestation Statement for Scribe #1: I, Juany Quarles NP , personally performed the services described in this documentation, as scribed by Lucia Rosario, in my presence, and it is both accurate and complete.           Clinical Impression       ICD-10-CM ICD-9-CM   1. Contact dermatitis, unspecified contact dermatitis type, unspecified trigger L25.9 692.9       Disposition:   Disposition: Discharged  Condition: Stable       Juany Quarles NP  07/23/19 9905

## 2019-07-23 NOTE — DISCHARGE INSTRUCTIONS
Return to the emergency room for any of the following:  Spreading of the rash to other parts of your body, Severe swelling of your face, eyelids, mouth, throat or tongue, Fever of 100.4°F (38°C) or higher, Redness, swelling, or pain that gets worse, Foul-smelling fluid leaking from the skin, Yellow-brown crusts on the open blisters, weakness, or any concerns.

## 2019-07-24 NOTE — ED NOTES
After lab called about not enough blood in tubes, pt stuck 3 additional times with butterfly needle without success.

## 2019-08-27 ENCOUNTER — TELEPHONE (OUTPATIENT)
Dept: OBSTETRICS AND GYNECOLOGY | Facility: CLINIC | Age: 24
End: 2019-08-27

## 2019-08-27 NOTE — TELEPHONE ENCOUNTER
----- Message from Ashley Schulz sent at 8/27/2019  9:58 AM CDT -----  Contact: self  needs to schedule with pamela to discuss anxiety..[phone] (bzap)

## 2019-08-27 NOTE — TELEPHONE ENCOUNTER
Spoke to patient and scheduled her appointment for 09/19/19 at 11:30am to see Dr. Valencia at the [place] location. Patient verbalized understanding.

## 2019-09-17 ENCOUNTER — PATIENT MESSAGE (OUTPATIENT)
Dept: OBSTETRICS AND GYNECOLOGY | Facility: CLINIC | Age: 24
End: 2019-09-17

## 2019-09-17 DIAGNOSIS — R21 RASH OF HANDS: Primary | ICD-10-CM

## 2019-10-31 ENCOUNTER — TELEPHONE (OUTPATIENT)
Dept: OBSTETRICS AND GYNECOLOGY | Facility: CLINIC | Age: 24
End: 2019-10-31

## 2019-10-31 NOTE — TELEPHONE ENCOUNTER
----- Message from Ximena Joyce sent at 10/30/2019  6:20 PM CDT -----  Contact: Pt  Patient requesting a call back regarding scheduling appt    Please call    Phone [phone]

## 2019-11-25 ENCOUNTER — OFFICE VISIT (OUTPATIENT)
Dept: OBSTETRICS AND GYNECOLOGY | Facility: CLINIC | Age: 24
End: 2019-11-25
Payer: MEDICAID

## 2019-11-25 VITALS
HEIGHT: 63 IN | WEIGHT: 138 LBS | SYSTOLIC BLOOD PRESSURE: 102 MMHG | DIASTOLIC BLOOD PRESSURE: 64 MMHG | BODY MASS INDEX: 24.45 KG/M2

## 2019-11-25 DIAGNOSIS — F41.9 ANXIETY AND DEPRESSION: ICD-10-CM

## 2019-11-25 DIAGNOSIS — Z30.42 ENCOUNTER FOR SURVEILLANCE OF INJECTABLE CONTRACEPTIVE: ICD-10-CM

## 2019-11-25 DIAGNOSIS — F32.A ANXIETY AND DEPRESSION: ICD-10-CM

## 2019-11-25 DIAGNOSIS — Z30.42 DEPO-PROVERA CONTRACEPTIVE STATUS: Primary | ICD-10-CM

## 2019-11-25 LAB
B-HCG UR QL: NEGATIVE
CTP QC/QA: YES

## 2019-11-25 PROCEDURE — 99213 OFFICE O/P EST LOW 20 MIN: CPT | Mod: PBBFAC | Performed by: OBSTETRICS & GYNECOLOGY

## 2019-11-25 PROCEDURE — 99213 PR OFFICE/OUTPT VISIT, EST, LEVL III, 20-29 MIN: ICD-10-PCS | Mod: S$PBB,,, | Performed by: OBSTETRICS & GYNECOLOGY

## 2019-11-25 PROCEDURE — 99213 OFFICE O/P EST LOW 20 MIN: CPT | Mod: S$PBB,,, | Performed by: OBSTETRICS & GYNECOLOGY

## 2019-11-25 PROCEDURE — 99999 PR PBB SHADOW E&M-EST. PATIENT-LVL III: CPT | Mod: PBBFAC,,, | Performed by: OBSTETRICS & GYNECOLOGY

## 2019-11-25 PROCEDURE — 81025 URINE PREGNANCY TEST: CPT | Mod: PBBFAC | Performed by: OBSTETRICS & GYNECOLOGY

## 2019-11-25 PROCEDURE — 99999 PR PBB SHADOW E&M-EST. PATIENT-LVL III: ICD-10-PCS | Mod: PBBFAC,,, | Performed by: OBSTETRICS & GYNECOLOGY

## 2019-11-25 PROCEDURE — 96372 THER/PROPH/DIAG INJ SC/IM: CPT | Mod: PBBFAC

## 2019-11-25 RX ORDER — BUSPIRONE HYDROCHLORIDE 10 MG/1
10 TABLET ORAL 3 TIMES DAILY
Qty: 90 TABLET | Refills: 11 | Status: SHIPPED | OUTPATIENT
Start: 2019-11-25 | End: 2020-12-04

## 2019-11-25 RX ORDER — MEDROXYPROGESTERONE ACETATE 150 MG/ML
150 INJECTION, SUSPENSION INTRAMUSCULAR
Status: DISCONTINUED | OUTPATIENT
Start: 2019-11-25 | End: 2020-12-04

## 2019-11-25 RX ORDER — UREA 40 %
CREAM (GRAM) TOPICAL
COMMUNITY
Start: 2019-11-05 | End: 2020-04-27

## 2019-11-25 RX ADMIN — MEDROXYPROGESTERONE ACETATE 150 MG: 150 INJECTION, SUSPENSION INTRAMUSCULAR at 12:11

## 2019-11-25 NOTE — PROGRESS NOTES
Two patient identifiers verified  Patient notified to wait 15 minutes in clinic after injection, patient verbalized understanding.   150mg/ml of depo provera administered IM to Left ventrogluteal   Patient tolerated well.   Next injection scheduled for 02/10/20 at the [facility] location.

## 2019-11-25 NOTE — PROGRESS NOTES
Subjective:       Patient ID: Nena Nascimento is a 24 y.o. female.    Chief Complaint:  Contraception      History of Present Illness  HPI  25 yo  presents to restart on depo-provera. Had PP visit 2019, s/p only 1 injection. LMP 10/14/19. Recently dx w/ psoriasis w/ symptoms on bilateral palms. C/o anxiety, depression, severe enough to present to ER. Did not do well on zoloft in past. Does not have PCP    GYN & OB History  Patient's last menstrual period was 10/14/2019.   Date of Last Pap: 2019    OB History    Para Term  AB Living   5 3 2 1 2 3   SAB TAB Ectopic Multiple Live Births   1 0 0 0 3      # Outcome Date GA Lbr Genaro/2nd Weight Sex Delivery Anes PTL Lv   5  19 36w3d  2.35 kg (5 lb 2.9 oz) F CS-LTranv Spinal Y ALICIA      Complications: Chorioamnionitis   4 Term 03/26/15 39w0d  3.487 kg (7 lb 11 oz) M CS-Unspec   ALICIA   3 Term 11/05/10 40w0d  2.948 kg (6 lb 8 oz) F CS-Unspec   ALICIA   2 AB            1 SAB                Review of Systems  Review of Systems   Psychiatric/Behavioral: Positive for dysphoric mood. The patient is nervous/anxious.    All other systems reviewed and are negative.         Objective:     Physical Exam   Constitutional: She is oriented to person, place, and time. She appears well-developed and well-nourished.   Pulmonary/Chest: Effort normal.   Musculoskeletal: Normal range of motion.   Neurological: She is alert and oriented to person, place, and time.   Skin: Skin is warm and dry.   Psychiatric: She has a normal mood and affect. Her behavior is normal.     UPT neg     Assessment:        1. Depo-Provera contraceptive status    2. Encounter for surveillance of injectable contraceptive    3. Anxiety and depression          Plan:      1. Trial of buspar; pt instructed to f/u w PCP for long term care  2. Depo-provera injection. Compliance discussed

## 2020-02-10 ENCOUNTER — CLINICAL SUPPORT (OUTPATIENT)
Dept: OBSTETRICS AND GYNECOLOGY | Facility: CLINIC | Age: 25
End: 2020-02-10
Payer: MEDICAID

## 2020-02-10 DIAGNOSIS — Z30.42 ENCOUNTER FOR DEPO-PROVERA CONTRACEPTION: Primary | ICD-10-CM

## 2020-02-10 PROCEDURE — 99999 PR PBB SHADOW E&M-EST. PATIENT-LVL II: ICD-10-PCS | Mod: PBBFAC,,,

## 2020-02-10 PROCEDURE — 99212 OFFICE O/P EST SF 10 MIN: CPT | Mod: PBBFAC,25

## 2020-02-10 PROCEDURE — 99999 PR PBB SHADOW E&M-EST. PATIENT-LVL II: CPT | Mod: PBBFAC,,,

## 2020-02-10 PROCEDURE — 96372 THER/PROPH/DIAG INJ SC/IM: CPT | Mod: PBBFAC

## 2020-02-10 RX ORDER — HYDROXYZINE HYDROCHLORIDE 50 MG/1
50 TABLET, FILM COATED ORAL
COMMUNITY
Start: 2019-12-10 | End: 2020-04-27

## 2020-02-10 RX ORDER — CLOBETASOL PROPIONATE 0.5 MG/G
1 CREAM TOPICAL 2 TIMES DAILY
COMMUNITY
Start: 2019-12-10

## 2020-02-10 RX ADMIN — MEDROXYPROGESTERONE ACETATE 150 MG: 150 INJECTION, SUSPENSION INTRAMUSCULAR at 11:02

## 2020-02-10 NOTE — PROGRESS NOTES
Verified pt by two identifiers: name and date of birth.Allergies and medications reviewed.     Depo Provera 150 mg/ml Given IM to right ventrogluteal using aseptic technique. No discomfort noted, pt tolerated well. Advised to wait 15 minutes in clinic to monitor.Next injection scheduled on 4/27/20 1:45 PM.  Patient verbalized understanding.

## 2020-02-12 ENCOUNTER — TELEPHONE (OUTPATIENT)
Dept: OBSTETRICS AND GYNECOLOGY | Facility: CLINIC | Age: 25
End: 2020-02-12

## 2020-02-12 NOTE — TELEPHONE ENCOUNTER
----- Message from Sandor Andrea sent at 2020 11:13 AM CST -----  Contact: Patient  Patient called in and wanted to speak with the office regarding documentation that was faxed over concerning her . She would like a call back from the office and can be reached at    [phone]

## 2020-04-27 ENCOUNTER — LAB VISIT (OUTPATIENT)
Dept: LAB | Facility: HOSPITAL | Age: 25
End: 2020-04-27
Attending: OBSTETRICS & GYNECOLOGY
Payer: MEDICAID

## 2020-04-27 ENCOUNTER — OFFICE VISIT (OUTPATIENT)
Dept: OBSTETRICS AND GYNECOLOGY | Facility: CLINIC | Age: 25
End: 2020-04-27
Payer: MEDICAID

## 2020-04-27 VITALS
BODY MASS INDEX: 24.41 KG/M2 | SYSTOLIC BLOOD PRESSURE: 100 MMHG | WEIGHT: 137.81 LBS | DIASTOLIC BLOOD PRESSURE: 80 MMHG

## 2020-04-27 DIAGNOSIS — Z11.3 SCREEN FOR STD (SEXUALLY TRANSMITTED DISEASE): ICD-10-CM

## 2020-04-27 DIAGNOSIS — Z11.3 SCREEN FOR STD (SEXUALLY TRANSMITTED DISEASE): Primary | ICD-10-CM

## 2020-04-27 DIAGNOSIS — R10.2 PELVIC PAIN IN FEMALE: ICD-10-CM

## 2020-04-27 PROCEDURE — 99999 PR PBB SHADOW E&M-EST. PATIENT-LVL II: CPT | Mod: PBBFAC,,, | Performed by: OBSTETRICS & GYNECOLOGY

## 2020-04-27 PROCEDURE — 99395 PREV VISIT EST AGE 18-39: CPT | Mod: S$PBB,,, | Performed by: OBSTETRICS & GYNECOLOGY

## 2020-04-27 PROCEDURE — 36415 COLL VENOUS BLD VENIPUNCTURE: CPT

## 2020-04-27 PROCEDURE — 99212 OFFICE O/P EST SF 10 MIN: CPT | Mod: PBBFAC | Performed by: OBSTETRICS & GYNECOLOGY

## 2020-04-27 PROCEDURE — 86592 SYPHILIS TEST NON-TREP QUAL: CPT

## 2020-04-27 PROCEDURE — 99395 PR PREVENTIVE VISIT,EST,18-39: ICD-10-PCS | Mod: S$PBB,,, | Performed by: OBSTETRICS & GYNECOLOGY

## 2020-04-27 PROCEDURE — 80074 ACUTE HEPATITIS PANEL: CPT

## 2020-04-27 PROCEDURE — 86703 HIV-1/HIV-2 1 RESULT ANTBDY: CPT

## 2020-04-27 PROCEDURE — 99999 PR PBB SHADOW E&M-EST. PATIENT-LVL II: ICD-10-PCS | Mod: PBBFAC,,, | Performed by: OBSTETRICS & GYNECOLOGY

## 2020-04-27 RX ORDER — FLUOXETINE 10 MG/1
10 CAPSULE ORAL DAILY
Qty: 30 CAPSULE | Refills: 2 | Status: SHIPPED | OUTPATIENT
Start: 2020-04-27 | End: 2022-10-25

## 2020-04-27 RX ORDER — MEDROXYPROGESTERONE ACETATE 150 MG/ML
150 INJECTION, SUSPENSION INTRAMUSCULAR
Status: DISCONTINUED | OUTPATIENT
Start: 2020-04-27 | End: 2020-12-04

## 2020-04-27 RX ADMIN — MEDROXYPROGESTERONE ACETATE 150 MG: 150 INJECTION, SUSPENSION, EXTENDED RELEASE INTRAMUSCULAR at 02:04

## 2020-04-27 NOTE — PROGRESS NOTES
CHIEF COMPLAINT:   Gynecologic Exam  Chief Complaint   Patient presents with    Well Woman    Contraception    Pelvic Pain     right side        HISTORY OF PRESENT ILLNESS  Nena Nascimento  presents for annual exam.   She is sexually active.  Contraception:  Depo provera (would like next dose, this is one wk early, however will administer it to prevent excess return trips to the clinic due to corona virus shelter at home ordinance).  She would like routine STD check.  She is having R sided pelvic pain since the c/section - dull, constant, 3/10 but worse with turning and sex. No vag d/c. No n/v/f. Wondering if this is due to scar tissue. (op note reviewed).  She is also consitpated - q3d BM and this is not normal for her. No hematochezia.  Also, has anxiety and depression - had weaned off of buspar as did not think this worked. zoloft in past made her have suicidal thoughts. She has no SI/HI thoughts currently, just feels anxious and down, and alone. However she says she has a wonderful family support. She is interested in talk therapy. In meantime also would like to try a new medicaiton.       No LMP recorded. Patient has had an injection.    GYN screening history: last Pap: was normal 2019. Never had any abnormal Pap smears in past.    She has no ocrisks.      Health Maintenance   Topic Date Due    Lipid Panel  1993    Pap Smear  2022    TETANUS VACCINE  2029       HISTORY  Patient Active Problem List   Diagnosis    Mild tetrahydrocannabinol (THC) abuse    Encounter for supervision of normal pregnancy in multigravida    Asthma    S/P  section    Influenza A    Acute cystitis with hematuria       Past Medical History:   Diagnosis Date    Asthma     Previous  section 2018    #1 arrest of labor second stage, failed vac ext. LTCS. Op note in media. #2 repeat. al    Psoriasis 2019       Past Surgical History:   Procedure Laterality Date     SECTION  N/A 3/17/2019    Procedure:  SECTION;  Surgeon: Allison Valencia MD;  Location: [facility] L&D;  Service: OB/GYN;  Laterality: N/A;     SECTION      x 3    WISDOM TOOTH EXTRACTION         Family History   Problem Relation Age of Onset    Breast cancer Maternal Aunt     Ovarian cancer Neg Hx     Colon cancer Neg Hx        Social History     Socioeconomic History    Marital status:      Spouse name: Not on file    Number of children: Not on file    Years of education: Not on file    Highest education level: Not on file   Occupational History    Not on file   Social Needs    Financial resource strain: Not on file    Food insecurity:     Worry: Not on file     Inability: Not on file    Transportation needs:     Medical: Not on file     Non-medical: Not on file   Tobacco Use    Smoking status: Never Smoker    Smokeless tobacco: Never Used   Substance and Sexual Activity    Alcohol use: Yes     Frequency: Never     Comment: SOCIALLY    Drug use: Yes     Frequency: 1.0 times per week     Types: Marijuana     Comment: stopped during pregnancy     Sexual activity: Yes     Partners: Male     Birth control/protection: None   Lifestyle    Physical activity:     Days per week: Not on file     Minutes per session: Not on file    Stress: Not on file   Relationships    Social connections:     Talks on phone: Not on file     Gets together: Not on file     Attends Jehovah's witness service: Not on file     Active member of club or organization: Not on file     Attends meetings of clubs or organizations: Not on file     Relationship status: Not on file   Other Topics Concern    Not on file   Social History Narrative    Not on file       Current Outpatient Medications   Medication Sig Dispense Refill    albuterol (ACCUNEB) 0.63 mg/3 mL Nebu Take 0.63 mg by nebulization every 6 (six) hours as needed. Rescue      busPIRone (BUSPAR) 10 MG tablet Take 1 tablet (10 mg total) by mouth 3 (three) times  daily. 90 tablet 11    clobetasoL (TEMOVATE) 0.05 % cream Apply 1 application topically 2 (two) times daily.      diphenhydrAMINE (BENADRYL) 25 mg capsule Take 2 capsules (50 mg total) by mouth every 6 (six) hours as needed for Itching. 20 capsule 0    FLUoxetine 10 MG capsule Take 1 capsule (10 mg total) by mouth once daily. 30 capsule 2     Current Facility-Administered Medications   Medication Dose Route Frequency Provider Last Rate Last Dose    medroxyPROGESTERone (DEPO-PROVERA) injection 150 mg  150 mg Intramuscular Q90 Days Amna Cabrera MD   150 mg at 02/10/20 1100    medroxyPROGESTERone (DEPO-PROVERA) injection 150 mg  150 mg Intramuscular Q90 Days Allison Valencia MD   150 mg at 04/27/20 1450       Review of patient's allergies indicates:   Allergen Reactions    Latex, natural rubber Itching and Swelling           PHYSICAL EXAM     Vitals:    04/27/20 1352   BP: 100/80   Weight: 62.5 kg (137 lb 12.6 oz)   PainSc:   6        PAIN SCALE: 0/10 None    ROS:  GENERAL: No fever, chills, fatigability or weight loss.  ABDOMEN: Appetite fine. No weight loss. Denies diarrhea, abdominal pain, hematemesis or blood in stool.  No change in bowel movement pattern.  URINARY: No flank pain, dysuria or hematuria.  REPRODUCTIVE: No abnormal vaginal bleeding.  BREASTS: Breasts symmetric, nontender and no lumps detected.    PE:   APPEARANCE: Well nourished, well developed, in no acute distress.  NECK: Neck symmetric without masses or thyromegaly.    NODES: No inguinal lymph node enlargement.  ABDOMEN: Soft. No  Masses - just irregularity along her pfannensteil scar and tender in this area. abd is not acute, no rebound.  BREASTS: Symmetrical, no skin changes or visible lesions. No palpable masses, nipple discharge or adenopathy bilaterally.    PELVIC:   VULVA: No lesions. Normal female genitalia.  URETHRAL MEATUS: Normal size and location, no lesions, no prolapse.  URETHRA: No masses, tenderness, prolapse or  scarring.  VAGINA: Moist and well rugated, no discharge, no significant cystocele or rectocele.  CERVIX: No lesions, normal diameter, no stenosis, no cervical motion tenderness.  UTERUS: 8 week size, regular shape, limited mobility - feels adhesed to ant abd wall,   normal position, good support. With bimanual exam the uterus is globally tender, as is the right adnexal region. Suprapubic area also tender.   ADNEXA: No masses, tenderness or CDS nodularity.  ANUS PERINEUM: No lesions, no relaxation, no external hemorrhoids.      DIAGNOSIS:      1. Screen for STD (sexually transmitted disease)    2. Pelvic pain in female        PLAN:   Orders Placed This Encounter   Procedures    C. trachomatis/N. gonorrhoeae by AMP DNA     Standing Status:   Future     Number of Occurrences:   1     Standing Expiration Date:   5/27/2020     Order Specific Question:   Source:     Answer:   Urine    US Pelvis Limited Non OB     Standing Status:   Future     Standing Expiration Date:   4/27/2021     Order Specific Question:   Is the patient pregnant?     Answer:   No    Urinalysis, Reflex to Urine Culture Urine, Clean Catch     Standing Status:   Future     Number of Occurrences:   1     Standing Expiration Date:   6/26/2021     Order Specific Question:   Preferred Collection Type     Answer:   Urine, Clean Catch     Order Specific Question:   Specimen Source     Answer:   Urine    HIV 1/2 Ag/Ab (4th Gen)     Standing Status:   Future     Number of Occurrences:   1     Standing Expiration Date:   6/26/2021    RPR     Standing Status:   Future     Number of Occurrences:   1     Standing Expiration Date:   6/26/2021    Hepatitis Panel, Acute     Standing Status:   Future     Number of Occurrences:   1     Standing Expiration Date:   6/26/2021        MEDICATIONS PRESCRIBED:   PNV   Medications Ordered This Encounter   Medications    FLUoxetine 10 MG capsule     Sig: Take 1 capsule (10 mg total) by mouth once daily.     Dispense:  30  capsule     Refill:  2    medroxyPROGESTERone (DEPO-PROVERA) injection 150 mg        COUNSELING:  Patient was counseled today on medication treatment options and side effects. Desires to try prozac. rec to monitor and report and worsening curt suicidal thoughts.   A copy of 5 clinics and their phone numbers given to pt, but appt made w [facility] isabelle [facility].     FOLLOW-UP: With me after results. Will check on her therapy and prozac as well.

## 2020-04-28 LAB
HAV IGM SERPL QL IA: NEGATIVE
HBV CORE IGM SERPL QL IA: NEGATIVE
HBV SURFACE AG SERPL QL IA: NEGATIVE
HCV AB SERPL QL IA: NEGATIVE
HIV 1+2 AB+HIV1 P24 AG SERPL QL IA: NEGATIVE
RPR SER QL: NORMAL

## 2020-05-07 ENCOUNTER — TELEPHONE (OUTPATIENT)
Dept: RADIOLOGY | Facility: HOSPITAL | Age: 25
End: 2020-05-07

## 2020-07-15 ENCOUNTER — CLINICAL SUPPORT (OUTPATIENT)
Dept: OBSTETRICS AND GYNECOLOGY | Facility: CLINIC | Age: 25
End: 2020-07-15
Payer: MEDICAID

## 2020-07-15 PROCEDURE — 96372 THER/PROPH/DIAG INJ SC/IM: CPT | Mod: PBBFAC

## 2020-07-15 RX ADMIN — MEDROXYPROGESTERONE ACETATE 150 MG: 150 INJECTION, SUSPENSION, EXTENDED RELEASE INTRAMUSCULAR at 10:07

## 2020-07-15 NOTE — PROGRESS NOTES
After using two patient identifiers and reviewing allergies and medications, Pt. Received depo provera injection. Instructed pt. To wait in clinic for 15 minutes. Made next appt.

## 2020-10-07 ENCOUNTER — TELEPHONE (OUTPATIENT)
Dept: OBSTETRICS AND GYNECOLOGY | Facility: CLINIC | Age: 25
End: 2020-10-07

## 2020-10-07 NOTE — TELEPHONE ENCOUNTER
----- Message from Tata Romero sent at 10/7/2020 10:25 AM CDT -----  Regarding: depo  Type:  Needs Medical Advice    Who Called: pt  Symptoms (please be specific): depo injection DYI   How long has patient had these symptoms:  n/a  Pharmacy name and phone #:  n/a  Would the patient rather a call back or a response via [facility]? N/a  Best Call Back Number: [phone]  Additional Information: Please call back.Thanks

## 2020-10-07 NOTE — TELEPHONE ENCOUNTER
----- Message from Tata Romero sent at 10/7/2020 10:25 AM CDT -----  Regarding: depo  Type:  Needs Medical Advice    Who Called: pt  Symptoms (please be specific): depo injection DYI   How long has patient had these symptoms:  n/a  Pharmacy name and phone #:  n/a  Would the patient rather a call back or a response via [facility]? N/a  Best Call Back Number: [phone]  Additional Information: Please call back.Thanks

## 2020-11-02 ENCOUNTER — TELEPHONE (OUTPATIENT)
Dept: OBSTETRICS AND GYNECOLOGY | Facility: HOSPITAL | Age: 25
End: 2020-11-02

## 2020-11-04 ENCOUNTER — PATIENT MESSAGE (OUTPATIENT)
Dept: OBSTETRICS AND GYNECOLOGY | Facility: CLINIC | Age: 25
End: 2020-11-04

## 2020-11-05 ENCOUNTER — PATIENT MESSAGE (OUTPATIENT)
Dept: OBSTETRICS AND GYNECOLOGY | Facility: CLINIC | Age: 25
End: 2020-11-05

## 2020-12-02 ENCOUNTER — TELEPHONE (OUTPATIENT)
Dept: OBSTETRICS AND GYNECOLOGY | Facility: CLINIC | Age: 25
End: 2020-12-02

## 2020-12-02 NOTE — TELEPHONE ENCOUNTER
Called patient and she wants to restart depo.  Appointment scheduled.  Visitor's policy and check in procedure explained and patient verbalized understanding.

## 2020-12-02 NOTE — TELEPHONE ENCOUNTER
----- Message from Katalina Arredondo sent at 12/2/2020 10:25 AM CST -----  Contact: Pt  .Type:  Patient Returning Call    Who Called: Nena  Who Left Message for Patient:  Does the patient know what this is regarding?: pt needs soon appt for depo shot  Would the patient rather a call back or a response via [facility]?  callback  Best Call Back Number: .[phone] (home)     Additional Information:

## 2020-12-02 NOTE — TELEPHONE ENCOUNTER
----- Message from Yaima Navas sent at 12/1/2020  1:36 PM CST -----  Regarding: Appt  Pt is requesting call back in regards to scheduling appt for depo inj        Pls call back at [phone]

## 2020-12-02 NOTE — TELEPHONE ENCOUNTER
Attempted to contact patient, no answer. Left patient voice mail to return call to clinic.      Pt has not had her depo inj since 7/15/2020

## 2020-12-04 ENCOUNTER — OFFICE VISIT (OUTPATIENT)
Dept: OBSTETRICS AND GYNECOLOGY | Facility: CLINIC | Age: 25
End: 2020-12-04
Payer: MEDICAID

## 2020-12-04 VITALS
SYSTOLIC BLOOD PRESSURE: 110 MMHG | DIASTOLIC BLOOD PRESSURE: 80 MMHG | BODY MASS INDEX: 24.92 KG/M2 | WEIGHT: 140.63 LBS

## 2020-12-04 DIAGNOSIS — Z12.72 SMEAR, VAGINAL, AS PART OF ROUTINE GYNECOLOGICAL EXAMINATION: ICD-10-CM

## 2020-12-04 DIAGNOSIS — Z01.419 SMEAR, VAGINAL, AS PART OF ROUTINE GYNECOLOGICAL EXAMINATION: ICD-10-CM

## 2020-12-04 DIAGNOSIS — Z30.42 DEPO-PROVERA CONTRACEPTIVE STATUS: Primary | ICD-10-CM

## 2020-12-04 DIAGNOSIS — Z00.00 PREVENTATIVE HEALTH CARE: ICD-10-CM

## 2020-12-04 LAB
B-HCG UR QL: NEGATIVE
CTP QC/QA: YES

## 2020-12-04 PROCEDURE — 99395 PREV VISIT EST AGE 18-39: CPT | Mod: S$PBB,,, | Performed by: NURSE PRACTITIONER

## 2020-12-04 PROCEDURE — 99999 PR PBB SHADOW E&M-EST. PATIENT-LVL III: CPT | Mod: PBBFAC,,, | Performed by: NURSE PRACTITIONER

## 2020-12-04 PROCEDURE — 99999 PR PBB SHADOW E&M-EST. PATIENT-LVL III: ICD-10-PCS | Mod: PBBFAC,,, | Performed by: NURSE PRACTITIONER

## 2020-12-04 PROCEDURE — 88175 CYTOPATH C/V AUTO FLUID REDO: CPT

## 2020-12-04 PROCEDURE — 99395 PR PREVENTIVE VISIT,EST,18-39: ICD-10-PCS | Mod: S$PBB,,, | Performed by: NURSE PRACTITIONER

## 2020-12-04 PROCEDURE — 99213 OFFICE O/P EST LOW 20 MIN: CPT | Mod: PBBFAC | Performed by: NURSE PRACTITIONER

## 2020-12-04 RX ORDER — MEDROXYPROGESTERONE ACETATE 150 MG/ML
150 INJECTION, SUSPENSION INTRAMUSCULAR
Status: COMPLETED | OUTPATIENT
Start: 2020-12-04 | End: 2021-08-12

## 2020-12-04 RX ADMIN — MEDROXYPROGESTERONE ACETATE 150 MG: 150 INJECTION, SUSPENSION INTRAMUSCULAR at 10:12

## 2020-12-04 NOTE — PROGRESS NOTES
Verified patient with 2 patient identifiers. Allergies and medications reviewed.   Depo Provera 150mg/ml given IM to left ventrogluteal using aseptic technique.   No discomfort noted. Patient tolerated well.     Next Depo injection scheduled.    Patient advised to wait 15 minutes in lobby to monitor for reaction.   Patient verbalized understanding.

## 2020-12-04 NOTE — PROGRESS NOTES
"CC: Well woman exam    Nena Nascimento is a 25 y.o. female  presents for well woman exam.  LMP: 2020. Last pap exam was normal.Is sexually active. Patient " wants to restart depo-provera injections". UPT in clinic was negative.  No issues, problems, or complaints.  Denies pelvic pain.    Past Medical History:   Diagnosis Date    Asthma     Previous  section 2018    #1 arrest of labor second stage, failed vac ext. LTCS. Op note in media. #2 repeat. al    Psoriasis      Past Surgical History:   Procedure Laterality Date     SECTION N/A 3/17/2019    Procedure:  SECTION;  Surgeon: Allison Valencia MD;  Location: [facility] L&D;  Service: OB/GYN;  Laterality: N/A;     SECTION      x 3    WISDOM TOOTH EXTRACTION       Social History     Socioeconomic History    Marital status:      Spouse name: Not on file    Number of children: Not on file    Years of education: Not on file    Highest education level: Not on file   Occupational History    Not on file   Social Needs    Financial resource strain: Not on file    Food insecurity     Worry: Not on file     Inability: Not on file    Transportation needs     Medical: Not on file     Non-medical: Not on file   Tobacco Use    Smoking status: Never Smoker    Smokeless tobacco: Never Used   Substance and Sexual Activity    Alcohol use: Yes     Frequency: Monthly or less     Comment: SOCIALLY    Drug use: Yes     Types: Marijuana    Sexual activity: Yes     Partners: Male     Birth control/protection: Injection   Lifestyle    Physical activity     Days per week: Not on file     Minutes per session: Not on file    Stress: Not on file   Relationships    Social connections     Talks on phone: Not on file     Gets together: Not on file     Attends Latter day service: Not on file     Active member of club or organization: Not on file     Attends meetings of clubs or organizations: Not on file     Relationship " status: Not on file   Other Topics Concern    Not on file   Social History Narrative    Not on file     Family History   Problem Relation Age of Onset    Breast cancer Maternal Aunt     Schizophrenia Father     Anxiety disorder Father     Bipolar disorder Mother     Ovarian cancer Neg Hx     Colon cancer Neg Hx      OB History        5    Para   3    Term   2       1    AB   2    Living   3       SAB   1    TAB   0    Ectopic   0    Multiple   0    Live Births   3                 /80   Wt 63.8 kg (140 lb 10.5 oz)   BMI 24.92 kg/m²       ROS:  GENERAL: Denies weight gain or weight loss. Feeling well overall.   SKIN: Denies rash or lesions.   HEAD: Denies head injury or headache.   NODES: Denies enlarged lymph nodes.   CHEST: Denies chest pain or shortness of breath.   CARDIOVASCULAR: Denies palpitations or left sided chest pain.   ABDOMEN: No abdominal pain, constipation, diarrhea, nausea, vomiting or rectal bleeding.   URINARY: No frequency, dysuria, hematuria, or burning on urination.  REPRODUCTIVE: See HPI.   BREASTS: The patient performs breast self-examination and denies pain, lumps, or nipple discharge.   HEMATOLOGIC: No easy bruisability or excessive bleeding.   MUSCULOSKELETAL: Denies joint pain or swelling.   NEUROLOGIC: Denies syncope or weakness.   PSYCHIATRIC: Denies depression, anxiety or mood swings.    PHYSICAL EXAM:  APPEARANCE: Well nourished, well developed, in no acute distress.  AFFECT: WNL, alert and oriented x 3  SKIN: No acne or hirsutism  NECK: Neck symmetric without masses or thyromegaly  NODES: No inguinal, cervical, axillary, or femoral lymph node enlargement  CHEST: Good respiratory effect  ABDOMEN: Soft.  No tenderness or masses.  No hepatosplenomegaly.  No hernias.  BREASTS: Symmetrical, no skin changes or visible lesions.  No palpable masses, nipple discharge bilaterally.  PELVIC: Normal external genitalia without lesions.  Normal hair distribution.   Adequate perineal body, normal urethral meatus.  Vagina moist and well rugated without lesions or discharge.  Cervix pink, without lesions, discharge or tenderness.  Bimanual exam shows uterus to be normal size, regular, mobile and nontender.  Adnexa without masses or tenderness.    EXTREMITIES: No edema.    1. Depo-Provera contraceptive status  POCT urine pregnancy   2. Preventative health care  Liquid-Based Pap Smear, Screening   3. Smear, vaginal, as part of routine gynecological examination  Liquid-Based Pap Smear, Screening    PLAN:  UPT negative  Depo-provera injection  Pap exam  Patient was counseled today on A.C.S. Pap guidelines and recommendations for yearly pelvic exams, mammograms and monthly self breast exams; to see her PCP for other health maintenance.

## 2020-12-15 LAB
FINAL PATHOLOGIC DIAGNOSIS: NORMAL
Lab: NORMAL

## 2020-12-16 ENCOUNTER — TELEPHONE (OUTPATIENT)
Dept: OBSTETRICS AND GYNECOLOGY | Facility: CLINIC | Age: 25
End: 2020-12-16

## 2021-02-19 ENCOUNTER — CLINICAL SUPPORT (OUTPATIENT)
Dept: OBSTETRICS AND GYNECOLOGY | Facility: CLINIC | Age: 26
End: 2021-02-19
Payer: MEDICAID

## 2021-02-19 PROCEDURE — 99212 OFFICE O/P EST SF 10 MIN: CPT | Mod: PBBFAC

## 2021-02-19 PROCEDURE — 99999 PR PBB SHADOW E&M-EST. PATIENT-LVL II: CPT | Mod: PBBFAC,,,

## 2021-02-19 PROCEDURE — 96372 THER/PROPH/DIAG INJ SC/IM: CPT | Mod: PBBFAC

## 2021-02-19 PROCEDURE — 99999 PR PBB SHADOW E&M-EST. PATIENT-LVL II: ICD-10-PCS | Mod: PBBFAC,,,

## 2021-02-19 RX ADMIN — MEDROXYPROGESTERONE ACETATE 150 MG: 150 INJECTION, SUSPENSION INTRAMUSCULAR at 10:02

## 2021-04-28 ENCOUNTER — PATIENT MESSAGE (OUTPATIENT)
Dept: RESEARCH | Facility: HOSPITAL | Age: 26
End: 2021-04-28

## 2021-05-14 ENCOUNTER — CLINICAL SUPPORT (OUTPATIENT)
Dept: OBSTETRICS AND GYNECOLOGY | Facility: CLINIC | Age: 26
End: 2021-05-14
Payer: MEDICAID

## 2021-05-14 PROCEDURE — 99212 OFFICE O/P EST SF 10 MIN: CPT | Mod: PBBFAC,25

## 2021-05-14 PROCEDURE — 99999 PR PBB SHADOW E&M-EST. PATIENT-LVL II: ICD-10-PCS | Mod: PBBFAC,,,

## 2021-05-14 PROCEDURE — 96372 THER/PROPH/DIAG INJ SC/IM: CPT | Mod: PBBFAC

## 2021-05-14 PROCEDURE — 99999 PR PBB SHADOW E&M-EST. PATIENT-LVL II: CPT | Mod: PBBFAC,,,

## 2021-05-14 RX ADMIN — MEDROXYPROGESTERONE ACETATE 150 MG: 150 INJECTION, SUSPENSION INTRAMUSCULAR at 10:05

## 2021-05-20 NOTE — TELEPHONE ENCOUNTER
Spoke with pt. Notified pt I have her return to work letter at the  at the [facility]. Pt stated she will pick it up this week.    WFL

## 2021-08-06 ENCOUNTER — TELEPHONE (OUTPATIENT)
Dept: OBSTETRICS AND GYNECOLOGY | Facility: CLINIC | Age: 26
End: 2021-08-06

## 2021-08-12 ENCOUNTER — CLINICAL SUPPORT (OUTPATIENT)
Dept: OBSTETRICS AND GYNECOLOGY | Facility: CLINIC | Age: 26
End: 2021-08-12
Payer: MEDICAID

## 2021-08-12 PROCEDURE — 96372 THER/PROPH/DIAG INJ SC/IM: CPT | Mod: PBBFAC

## 2021-08-12 RX ADMIN — MEDROXYPROGESTERONE ACETATE 150 MG: 150 INJECTION, SUSPENSION INTRAMUSCULAR at 02:08

## 2021-11-04 ENCOUNTER — CLINICAL SUPPORT (OUTPATIENT)
Dept: OBSTETRICS AND GYNECOLOGY | Facility: CLINIC | Age: 26
End: 2021-11-04
Payer: MEDICAID

## 2021-11-04 DIAGNOSIS — Z30.42 DEPO-PROVERA CONTRACEPTIVE STATUS: Primary | ICD-10-CM

## 2021-11-04 PROCEDURE — 99212 OFFICE O/P EST SF 10 MIN: CPT | Mod: PBBFAC

## 2021-11-04 PROCEDURE — 99999 PR PBB SHADOW E&M-EST. PATIENT-LVL II: ICD-10-PCS | Mod: PBBFAC,,,

## 2021-11-04 PROCEDURE — 96372 THER/PROPH/DIAG INJ SC/IM: CPT | Mod: PBBFAC

## 2021-11-04 PROCEDURE — 99999 PR PBB SHADOW E&M-EST. PATIENT-LVL II: CPT | Mod: PBBFAC,,,

## 2021-11-04 RX ORDER — MEDROXYPROGESTERONE ACETATE 150 MG/ML
150 INJECTION, SUSPENSION INTRAMUSCULAR
Status: COMPLETED | OUTPATIENT
Start: 2021-11-04 | End: 2021-11-04

## 2021-11-04 RX ADMIN — MEDROXYPROGESTERONE ACETATE 150 MG: 150 INJECTION, SUSPENSION INTRAMUSCULAR at 01:11

## 2022-01-27 ENCOUNTER — OFFICE VISIT (OUTPATIENT)
Dept: OBSTETRICS AND GYNECOLOGY | Facility: CLINIC | Age: 27
End: 2022-01-27
Payer: MEDICAID

## 2022-01-27 VITALS
SYSTOLIC BLOOD PRESSURE: 118 MMHG | BODY MASS INDEX: 26.24 KG/M2 | WEIGHT: 148.13 LBS | DIASTOLIC BLOOD PRESSURE: 62 MMHG

## 2022-01-27 DIAGNOSIS — Z30.42 ENCOUNTER FOR DEPO-PROVERA CONTRACEPTION: Primary | ICD-10-CM

## 2022-01-27 PROCEDURE — 3078F PR MOST RECENT DIASTOLIC BLOOD PRESSURE < 80 MM HG: ICD-10-PCS | Mod: CPTII,,, | Performed by: NURSE PRACTITIONER

## 2022-01-27 PROCEDURE — 1160F RVW MEDS BY RX/DR IN RCRD: CPT | Mod: CPTII,,, | Performed by: NURSE PRACTITIONER

## 2022-01-27 PROCEDURE — 99213 OFFICE O/P EST LOW 20 MIN: CPT | Mod: PBBFAC | Performed by: NURSE PRACTITIONER

## 2022-01-27 PROCEDURE — 99213 PR OFFICE/OUTPT VISIT, EST, LEVL III, 20-29 MIN: ICD-10-PCS | Mod: S$PBB,,, | Performed by: NURSE PRACTITIONER

## 2022-01-27 PROCEDURE — 3074F SYST BP LT 130 MM HG: CPT | Mod: CPTII,,, | Performed by: NURSE PRACTITIONER

## 2022-01-27 PROCEDURE — 1159F MED LIST DOCD IN RCRD: CPT | Mod: CPTII,,, | Performed by: NURSE PRACTITIONER

## 2022-01-27 PROCEDURE — 1160F PR REVIEW ALL MEDS BY PRESCRIBER/CLIN PHARMACIST DOCUMENTED: ICD-10-PCS | Mod: CPTII,,, | Performed by: NURSE PRACTITIONER

## 2022-01-27 PROCEDURE — 1159F PR MEDICATION LIST DOCUMENTED IN MEDICAL RECORD: ICD-10-PCS | Mod: CPTII,,, | Performed by: NURSE PRACTITIONER

## 2022-01-27 PROCEDURE — 3074F PR MOST RECENT SYSTOLIC BLOOD PRESSURE < 130 MM HG: ICD-10-PCS | Mod: CPTII,,, | Performed by: NURSE PRACTITIONER

## 2022-01-27 PROCEDURE — 99213 OFFICE O/P EST LOW 20 MIN: CPT | Mod: S$PBB,,, | Performed by: NURSE PRACTITIONER

## 2022-01-27 PROCEDURE — 99999 PR PBB SHADOW E&M-EST. PATIENT-LVL III: ICD-10-PCS | Mod: PBBFAC,,, | Performed by: NURSE PRACTITIONER

## 2022-01-27 PROCEDURE — 3008F PR BODY MASS INDEX (BMI) DOCUMENTED: ICD-10-PCS | Mod: CPTII,,, | Performed by: NURSE PRACTITIONER

## 2022-01-27 PROCEDURE — 3078F DIAST BP <80 MM HG: CPT | Mod: CPTII,,, | Performed by: NURSE PRACTITIONER

## 2022-01-27 PROCEDURE — 3008F BODY MASS INDEX DOCD: CPT | Mod: CPTII,,, | Performed by: NURSE PRACTITIONER

## 2022-01-27 PROCEDURE — 99999 PR PBB SHADOW E&M-EST. PATIENT-LVL III: CPT | Mod: PBBFAC,,, | Performed by: NURSE PRACTITIONER

## 2022-01-27 RX ORDER — MEDROXYPROGESTERONE ACETATE 150 MG/ML
150 INJECTION, SUSPENSION INTRAMUSCULAR
Status: DISCONTINUED | OUTPATIENT
Start: 2022-01-27 | End: 2022-10-25

## 2022-01-27 RX ORDER — FLUTICASONE PROPIONATE AND SALMETEROL XINAFOATE 115; 21 UG/1; UG/1
AEROSOL, METERED RESPIRATORY (INHALATION)
COMMUNITY

## 2022-01-27 RX ADMIN — MEDROXYPROGESTERONE ACETATE 150 MG: 150 INJECTION, SUSPENSION INTRAMUSCULAR at 02:01

## 2022-01-27 NOTE — PROGRESS NOTES
Chief Complaint   Patient presents with    Contraception        Nena Nascimento is a 26 y.o. female    Contraception visit desires to continue depo provera as method of birth control  Declines std screening     Past Medical History:   Diagnosis Date    Asthma     Previous  section 2018    #1 arrest of labor second stage, failed vac ext. LTCS. Op note in media. #2 repeat. al    Psoriasis      Past Surgical History:   Procedure Laterality Date     SECTION N/A 3/17/2019    Procedure:  SECTION;  Surgeon: Allison Valencia MD;  Location: [facility] L&D;  Service: OB/GYN;  Laterality: N/A;     SECTION      x 3    WISDOM TOOTH EXTRACTION       Social History     Tobacco Use    Smoking status: Never Smoker    Smokeless tobacco: Never Used   Substance Use Topics    Alcohol use: Yes     Comment: SOCIALLY    Drug use: Yes     Types: Marijuana     Family History   Problem Relation Age of Onset    Breast cancer Maternal Aunt     Schizophrenia Father     Anxiety disorder Father     Bipolar disorder Mother     Ovarian cancer Neg Hx     Colon cancer Neg Hx      OB History    Para Term  AB Living   5 3 2 1 2 3   SAB IAB Ectopic Multiple Live Births   1 0 0 0 3      # Outcome Date GA Lbr Genaro/2nd Weight Sex Delivery Anes PTL Lv   5  19 36w3d  2.35 kg (5 lb 2.9 oz) F CS-LTranv Spinal Y ALICIA      Complications: Chorioamnionitis   4 Term 03/26/15 39w0d  3.487 kg (7 lb 11 oz) M CS-Unspec   ALICIA   3 Term 11/05/10 40w0d  2.948 kg (6 lb 8 oz) F CS-Unspec   ALICIA   2 AB            1 SAB                Medication List with Changes/Refills   Current Medications    ALBUTEROL (ACCUNEB) 0.63 MG/3 ML NEBU    Take 0.63 mg by nebulization every 6 (six) hours as needed. Rescue    CLOBETASOL (TEMOVATE) 0.05 % CREAM    Apply 1 application topically 2 (two) times daily.    DIPHENHYDRAMINE (BENADRYL) 25 MG CAPSULE    Take 2 capsules (50 mg total) by mouth every 6  (six) hours as needed for Itching.    FLUOXETINE 10 MG CAPSULE    Take 1 capsule (10 mg total) by mouth once daily.    FLUTICASONE PROPION-SALMETEROL 115-21 MCG/DOSE (ADVAIR HFA) 115-21 MCG/ACTUATION HFAA INHALER    Inhale into the lungs.      /62   Wt 67.2 kg (148 lb 2.4 oz)   BMI 26.24 kg/m²     ROS:  Review of Systems      PHYSICAL EXAM:  OBGyn Exam        ASSESSMENT and PLAN:    ICD-10-CM ICD-9-CM    1. Encounter for Depo-Provera contraception  Z30.42 V25.49 medroxyPROGESTERone (DEPO-PROVERA) injection 150 mg       Tana Calvert, NP

## 2022-10-13 ENCOUNTER — TELEPHONE (OUTPATIENT)
Dept: OBSTETRICS AND GYNECOLOGY | Facility: CLINIC | Age: 27
End: 2022-10-13
Payer: MEDICAID

## 2022-10-13 NOTE — TELEPHONE ENCOUNTER
----- Message from Erika Phillips sent at 10/13/2022 10:30 AM CDT -----  Patient is requesting to be scheduled for annual,Please call back at [phone].

## 2022-10-18 ENCOUNTER — TELEPHONE (OUTPATIENT)
Dept: OBSTETRICS AND GYNECOLOGY | Facility: CLINIC | Age: 27
End: 2022-10-18
Payer: MEDICAID

## 2022-10-18 NOTE — TELEPHONE ENCOUNTER
----- Message from Deborah Keen sent at 10/18/2022 12:28 PM CDT -----  Contact: Nena  Patient is calling to reschedule appointment 10/18/2022. Please give patient a call back at [phone] as requested.  Thanks  LR

## 2022-10-25 ENCOUNTER — OFFICE VISIT (OUTPATIENT)
Dept: OBSTETRICS AND GYNECOLOGY | Facility: CLINIC | Age: 27
End: 2022-10-25
Payer: MEDICAID

## 2022-10-25 VITALS
WEIGHT: 137.38 LBS | DIASTOLIC BLOOD PRESSURE: 66 MMHG | SYSTOLIC BLOOD PRESSURE: 102 MMHG | BODY MASS INDEX: 24.34 KG/M2 | HEIGHT: 63 IN

## 2022-10-25 DIAGNOSIS — Z32.00 POSSIBLE PREGNANCY: ICD-10-CM

## 2022-10-25 DIAGNOSIS — Z30.42 ENCOUNTER FOR DEPO-PROVERA CONTRACEPTION: ICD-10-CM

## 2022-10-25 DIAGNOSIS — Z01.419 ROUTINE GYNECOLOGICAL EXAMINATION: Primary | ICD-10-CM

## 2022-10-25 LAB
B-HCG UR QL: NEGATIVE
CTP QC/QA: YES

## 2022-10-25 PROCEDURE — 99395 PR PREVENTIVE VISIT,EST,18-39: ICD-10-PCS | Mod: S$PBB,,, | Performed by: NURSE PRACTITIONER

## 2022-10-25 PROCEDURE — 3078F DIAST BP <80 MM HG: CPT | Mod: CPTII,,, | Performed by: NURSE PRACTITIONER

## 2022-10-25 PROCEDURE — 1160F PR REVIEW ALL MEDS BY PRESCRIBER/CLIN PHARMACIST DOCUMENTED: ICD-10-PCS | Mod: CPTII,,, | Performed by: NURSE PRACTITIONER

## 2022-10-25 PROCEDURE — 1160F RVW MEDS BY RX/DR IN RCRD: CPT | Mod: CPTII,,, | Performed by: NURSE PRACTITIONER

## 2022-10-25 PROCEDURE — 81025 URINE PREGNANCY TEST: CPT | Mod: PBBFAC | Performed by: NURSE PRACTITIONER

## 2022-10-25 PROCEDURE — 99999 PR PBB SHADOW E&M-EST. PATIENT-LVL III: ICD-10-PCS | Mod: PBBFAC,,, | Performed by: NURSE PRACTITIONER

## 2022-10-25 PROCEDURE — 99213 OFFICE O/P EST LOW 20 MIN: CPT | Mod: PBBFAC | Performed by: NURSE PRACTITIONER

## 2022-10-25 PROCEDURE — 3074F SYST BP LT 130 MM HG: CPT | Mod: CPTII,,, | Performed by: NURSE PRACTITIONER

## 2022-10-25 PROCEDURE — 1159F PR MEDICATION LIST DOCUMENTED IN MEDICAL RECORD: ICD-10-PCS | Mod: CPTII,,, | Performed by: NURSE PRACTITIONER

## 2022-10-25 PROCEDURE — 99395 PREV VISIT EST AGE 18-39: CPT | Mod: S$PBB,,, | Performed by: NURSE PRACTITIONER

## 2022-10-25 PROCEDURE — 3074F PR MOST RECENT SYSTOLIC BLOOD PRESSURE < 130 MM HG: ICD-10-PCS | Mod: CPTII,,, | Performed by: NURSE PRACTITIONER

## 2022-10-25 PROCEDURE — 1159F MED LIST DOCD IN RCRD: CPT | Mod: CPTII,,, | Performed by: NURSE PRACTITIONER

## 2022-10-25 PROCEDURE — 3078F PR MOST RECENT DIASTOLIC BLOOD PRESSURE < 80 MM HG: ICD-10-PCS | Mod: CPTII,,, | Performed by: NURSE PRACTITIONER

## 2022-10-25 PROCEDURE — 99999 PR PBB SHADOW E&M-EST. PATIENT-LVL III: CPT | Mod: PBBFAC,,, | Performed by: NURSE PRACTITIONER

## 2022-10-25 RX ORDER — MEDROXYPROGESTERONE ACETATE 150 MG/ML
150 INJECTION, SUSPENSION INTRAMUSCULAR
Status: DISPENSED | OUTPATIENT
Start: 2022-10-25 | End: 2024-01-18

## 2022-10-25 RX ADMIN — MEDROXYPROGESTERONE ACETATE 150 MG: 150 INJECTION, SUSPENSION INTRAMUSCULAR at 05:10

## 2022-10-25 NOTE — PROGRESS NOTES
"  Subjective:       Patient ID: Nena Nascimento is a 27 y.o. female.    Chief Complaint:  Well Woman and Contraception      History of Present Illness  HPI  Reports today for WWE  Desires to restart depo provera as method of birth control  Health Maintenance   Topic Date Due    Lipid Panel  Never done    Pap Smear  2023    TETANUS VACCINE  2029    Hepatitis C Screening  Completed     GYN & OB History  Patient's last menstrual period was 10/25/2022.   Date of Last Pap: 12/15/2020    OB History    Para Term  AB Living   5 3 2 1 2 3   SAB IAB Ectopic Multiple Live Births   1 0 0 0 3      # Outcome Date GA Lbr Genaro/2nd Weight Sex Delivery Anes PTL Lv   5  19 36w3d  2.35 kg (5 lb 2.9 oz) F CS-LTranv Spinal Y ALICIA      Complications: Chorioamnionitis   4 Term 03/26/15 39w0d  3.487 kg (7 lb 11 oz) M CS-Unspec   ALICIA   3 Term 11/05/10 40w0d  2.948 kg (6 lb 8 oz) F CS-Unspec   ALICIA   2 AB            1 SAB                Review of Systems  Review of Systems        Objective:   /66   Ht 5' 3" (1.6 m)   Wt 62.3 kg (137 lb 5.6 oz)   LMP 10/25/2022   BMI 24.33 kg/m²    Physical Exam:   Constitutional: She is oriented to person, place, and time. She appears well-developed and well-nourished.                       Musculoskeletal: Moves all extremeties.       Neurological: She is oriented to person, place, and time.    Skin: Skin is warm.    Psychiatric: She has a normal mood and affect. Her behavior is normal.      Assessment:        1. Routine gynecological examination    2. Possible pregnancy    3. Encounter for Depo-Provera contraception               Plan:      Return to clinic in one year for WWE      Nena was seen today for well woman and contraception.    Diagnoses and all orders for this visit:    Routine gynecological examination    Possible pregnancy  -     POCT Urine Pregnancy    Encounter for Depo-Provera contraception  -     medroxyPROGESTERone (DEPO-PROVERA) injection " 150 mg

## 2023-01-10 ENCOUNTER — CLINICAL SUPPORT (OUTPATIENT)
Dept: OBSTETRICS AND GYNECOLOGY | Facility: CLINIC | Age: 28
End: 2023-01-10
Payer: MEDICAID

## 2023-01-10 DIAGNOSIS — Z30.42 ENCOUNTER FOR DEPO-PROVERA CONTRACEPTION: Primary | ICD-10-CM

## 2023-01-10 PROCEDURE — 99999 PR PBB SHADOW E&M-EST. PATIENT-LVL II: CPT | Mod: PBBFAC,,,

## 2023-01-10 PROCEDURE — 99999 PR PBB SHADOW E&M-EST. PATIENT-LVL II: ICD-10-PCS | Mod: PBBFAC,,,

## 2023-01-10 PROCEDURE — 96372 THER/PROPH/DIAG INJ SC/IM: CPT | Mod: PBBFAC

## 2023-01-10 PROCEDURE — 99212 OFFICE O/P EST SF 10 MIN: CPT | Mod: PBBFAC

## 2023-01-10 RX ADMIN — MEDROXYPROGESTERONE ACETATE 150 MG: 150 INJECTION, SUSPENSION INTRAMUSCULAR at 10:01

## 2023-01-10 NOTE — PROGRESS NOTES
Patient in clinic today for contraception.    Two pt identifiers identified   Patient notified to wait 15 minutes after recieiving injection, patient verbalized understanding.   150 mg depo provera  administered IM to patients left  ventrogluteal.  Patient tolerated well.  Next injection scheduled.

## 2023-05-05 DIAGNOSIS — Z30.42 DEPO-PROVERA CONTRACEPTIVE STATUS: Primary | ICD-10-CM

## 2023-05-08 ENCOUNTER — LAB VISIT (OUTPATIENT)
Dept: LAB | Facility: HOSPITAL | Age: 28
End: 2023-05-08
Attending: NURSE PRACTITIONER
Payer: MEDICAID

## 2023-05-08 DIAGNOSIS — Z30.42 DEPO-PROVERA CONTRACEPTIVE STATUS: ICD-10-CM

## 2023-05-08 LAB — HCG INTACT+B SERPL-ACNC: <2.4 MIU/ML

## 2023-05-08 PROCEDURE — 36415 COLL VENOUS BLD VENIPUNCTURE: CPT | Performed by: NURSE PRACTITIONER

## 2023-05-08 PROCEDURE — 84702 CHORIONIC GONADOTROPIN TEST: CPT | Performed by: NURSE PRACTITIONER

## 2023-05-09 ENCOUNTER — CLINICAL SUPPORT (OUTPATIENT)
Dept: OBSTETRICS AND GYNECOLOGY | Facility: CLINIC | Age: 28
End: 2023-05-09
Payer: MEDICAID

## 2023-05-09 DIAGNOSIS — Z30.42 ENCOUNTER FOR MANAGEMENT AND INJECTION OF DEPO-PROVERA: Primary | ICD-10-CM

## 2023-05-09 PROCEDURE — 99999 PR PBB SHADOW E&M-EST. PATIENT-LVL II: CPT | Mod: PBBFAC,,,

## 2023-05-09 PROCEDURE — 96372 THER/PROPH/DIAG INJ SC/IM: CPT | Mod: PBBFAC

## 2023-05-09 PROCEDURE — 99999 PR PBB SHADOW E&M-EST. PATIENT-LVL II: ICD-10-PCS | Mod: PBBFAC,,,

## 2023-05-09 PROCEDURE — 99212 OFFICE O/P EST SF 10 MIN: CPT | Mod: PBBFAC,25

## 2023-05-09 RX ADMIN — MEDROXYPROGESTERONE ACETATE 150 MG: 150 INJECTION, SUSPENSION INTRAMUSCULAR at 10:05

## 2023-05-09 NOTE — PROGRESS NOTES
Pt in for encounter for surveillance of injectable contraceptives. Two patient identifiers verified. Patient notified to wait in clinic 15 minutes after injection, patient verbalized understanding. Depo provera administered IM to patient's right ventrogluteal. Patient tolerated well. Next injection scheduled.

## 2023-12-07 ENCOUNTER — OFFICE VISIT (OUTPATIENT)
Dept: OBSTETRICS AND GYNECOLOGY | Facility: CLINIC | Age: 28
End: 2023-12-07
Payer: MEDICAID

## 2023-12-07 VITALS
HEIGHT: 63 IN | BODY MASS INDEX: 24.1 KG/M2 | DIASTOLIC BLOOD PRESSURE: 70 MMHG | WEIGHT: 136 LBS | SYSTOLIC BLOOD PRESSURE: 110 MMHG

## 2023-12-07 DIAGNOSIS — Z11.3 SCREEN FOR STD (SEXUALLY TRANSMITTED DISEASE): ICD-10-CM

## 2023-12-07 DIAGNOSIS — N91.2 AMENORRHEA DUE TO DEPO PROVERA: ICD-10-CM

## 2023-12-07 DIAGNOSIS — Z30.42 ENCOUNTER FOR MANAGEMENT AND INJECTION OF DEPO-PROVERA: ICD-10-CM

## 2023-12-07 DIAGNOSIS — Z01.419 WELL WOMAN EXAM WITH ROUTINE GYNECOLOGICAL EXAM: Primary | ICD-10-CM

## 2023-12-07 PROCEDURE — 99999 PR PBB SHADOW E&M-EST. PATIENT-LVL III: CPT | Mod: PBBFAC,,,

## 2023-12-07 PROCEDURE — 1159F PR MEDICATION LIST DOCUMENTED IN MEDICAL RECORD: ICD-10-PCS | Mod: CPTII,,,

## 2023-12-07 PROCEDURE — 99385 PREV VISIT NEW AGE 18-39: CPT | Mod: S$PBB,,,

## 2023-12-07 PROCEDURE — 3074F SYST BP LT 130 MM HG: CPT | Mod: CPTII,,,

## 2023-12-07 PROCEDURE — 99385 PR PREVENTIVE VISIT,NEW,18-39: ICD-10-PCS | Mod: S$PBB,,,

## 2023-12-07 PROCEDURE — 99213 OFFICE O/P EST LOW 20 MIN: CPT | Mod: PBBFAC

## 2023-12-07 PROCEDURE — 3008F PR BODY MASS INDEX (BMI) DOCUMENTED: ICD-10-PCS | Mod: CPTII,,,

## 2023-12-07 PROCEDURE — 88175 CYTOPATH C/V AUTO FLUID REDO: CPT

## 2023-12-07 PROCEDURE — 3078F DIAST BP <80 MM HG: CPT | Mod: CPTII,,,

## 2023-12-07 PROCEDURE — 3074F PR MOST RECENT SYSTOLIC BLOOD PRESSURE < 130 MM HG: ICD-10-PCS | Mod: CPTII,,,

## 2023-12-07 PROCEDURE — 3078F PR MOST RECENT DIASTOLIC BLOOD PRESSURE < 80 MM HG: ICD-10-PCS | Mod: CPTII,,,

## 2023-12-07 PROCEDURE — 87624 HPV HI-RISK TYP POOLED RSLT: CPT

## 2023-12-07 PROCEDURE — 99999 PR PBB SHADOW E&M-EST. PATIENT-LVL III: ICD-10-PCS | Mod: PBBFAC,,,

## 2023-12-07 PROCEDURE — 1159F MED LIST DOCD IN RCRD: CPT | Mod: CPTII,,,

## 2023-12-07 PROCEDURE — 3008F BODY MASS INDEX DOCD: CPT | Mod: CPTII,,,

## 2023-12-07 PROCEDURE — 87491 CHLMYD TRACH DNA AMP PROBE: CPT

## 2023-12-07 NOTE — PROGRESS NOTES
Subjective:      Patient ID: Nena Nascimento is a 28 y.o. female.    Chief Complaint:  Well Woman      History of Present Illness  HPI  Annual Exam-Premenopausal  Patient presents for annual exam. The patient has no complaints today. The patient is not currently sexually active. GYN screening history: last pap: approximate date 2024 and was normal. The patient wears seatbelts: yes. The patient participates in regular exercise: yes. Has the patient ever been transfused or tattooed?: not asked. The patient reports that there is not domestic violence in her life.    STD testing, no blood today    Was on Depo Provera Injections, last injection was May 2023, menses are starting to resume, spotting here and there. Patient wants to resume injections, but not until her menses returns. Does not desire contraception today    GYN & OB History  No LMP recorded (lmp unknown).   Date of Last Pap: 2023    OB History    Para Term  AB Living   5 3 2 1 2 3   SAB IAB Ectopic Multiple Live Births   1 0 0 0 3      # Outcome Date GA Lbr Genaro/2nd Weight Sex Delivery Anes PTL Lv   5  19 36w3d  2.35 kg (5 lb 2.9 oz) F CS-LTranv Spinal Y ALICIA      Complications: Chorioamnionitis   4 Term 03/26/15 39w0d  3.487 kg (7 lb 11 oz) M CS-Unspec   ALICIA   3 Term 11/05/10 40w0d  2.948 kg (6 lb 8 oz) F CS-Unspec   ALICIA   2 AB            1 SAB                Review of Systems  Review of Systems   Constitutional:  Negative for activity change, appetite change, chills, fatigue and fever.   HENT:  Negative for nasal congestion and mouth sores.    Respiratory:  Negative for cough, shortness of breath and wheezing.    Cardiovascular:  Negative for chest pain.   Gastrointestinal:  Negative for abdominal pain, constipation, diarrhea, nausea and vomiting.   Endocrine: Negative for hair loss and hot flashes.   Genitourinary:  Negative for bladder incontinence, decreased libido, dysmenorrhea, dyspareunia, dysuria, frequency,  genital sores, hematuria, hot flashes, menorrhagia, menstrual problem, pelvic pain, urgency, vaginal discharge, vaginal pain, urinary incontinence, postcoital bleeding, vaginal dryness and vaginal odor.   Musculoskeletal:  Negative for back pain.   Integumentary:  Negative for breast mass, nipple discharge, breast skin changes and breast tenderness.   Neurological:  Negative for headaches.   Hematological:  Negative for adenopathy.   Psychiatric/Behavioral:  Negative for depression and sleep disturbance. The patient is not nervous/anxious.    All other systems reviewed and are negative.  Breast: Positive for breast self exam.Negative for lump, mass, mastodynia, nipple discharge, skin changes and tenderness         Objective:     Physical Exam:   Constitutional: She is oriented to person, place, and time. She appears well-developed and well-nourished. No distress.    HENT:   Head: Normocephalic and atraumatic.   Nose: Nose normal.    Eyes: Pupils are equal, round, and reactive to light. Conjunctivae and EOM are normal. Right eye exhibits no discharge. Left eye exhibits no discharge.     Cardiovascular:  Normal rate.      Exam reveals no clubbing, no cyanosis and no edema.        Pulmonary/Chest: Effort normal and breath sounds normal. No respiratory distress. She has no decreased breath sounds. She has no wheezes. She has no rhonchi. She has no rales. She exhibits no tenderness. Right breast exhibits no inverted nipple, no mass, no nipple discharge, no skin change, no tenderness, no bleeding and no swelling. Left breast exhibits no inverted nipple, no mass, no nipple discharge, no skin change, no tenderness, no bleeding and no swelling. Breasts are symmetrical.        Abdominal: Soft. Bowel sounds are normal. She exhibits no distension. There is no abdominal tenderness. There is no rebound and no guarding. Hernia confirmed negative in the right inguinal area and confirmed negative in the left inguinal area.      Genitourinary:    Inguinal canal, vagina, uterus, right adnexa and left adnexa normal.      Pelvic exam was performed with patient supine.   The external female genitalia was normal.   No external genitalia lesions identified,Genitalia hair distrobution normal .     Labial bartholins normal.There is no rash, tenderness, lesion or injury on the right labia. There is no rash, tenderness, lesion or injury on the left labia. There is no hernia on the left inguinal canal.  Rectum:      No external hemorrhoid.   No erythema, vaginal discharge, tenderness or bleeding in the vagina.    No foreign body in the vagina.      No signs of injury in the vagina.   Vagina was moist.Right adnexum displays no mass, no tenderness and no fullness. Left adnexum displays no mass, no tenderness and no fullness. Cervix is normal. Cervix exhibits no motion tenderness, no lesion, no discharge, no friability, no lesion, no tenderness and no polyp. Uerus contour normal  Uterus is not enlarged and not tender. Normal urethral meatus.Bladder findings: no bladder distention and no bladder tendernessBreasts:     Breasts are symmetrical.      Right: No inverted nipple, mass, nipple discharge, skin change or tenderness.      Left: No inverted nipple, mass, nipple discharge, skin change or tenderness.             Musculoskeletal: Normal range of motion and moves all extremeties.      Lymphadenopathy: No inguinal adenopathy noted on the right or left side.    Neurological: She is alert and oriented to person, place, and time.    Skin: Skin is warm and dry. No rash noted. She is not diaphoretic. No cyanosis or erythema. No pallor. Nails show no clubbing.    Psychiatric: She has a normal mood and affect. Her speech is normal and behavior is normal. Judgment and thought content normal.         Assessment:     1. Well woman exam with routine gynecological exam    2. Amenorrhea due to Depo Provera    3. Encounter for management and injection of  depo-Provera    4. Screen for STD (sexually transmitted disease)               Plan:      Well woman exam with routine gynecological exam  -     Liquid-Based Pap Smear, Screening  -     HPV High Risk Genotypes, PCR  - Reviewed updated recommendations for pap smears (every 3 years) in low risk patients.  Recommend annual pelvic exams.  Reviewed recommendations for annual breast check.  Next pap due in 2026.   RTC 1 year or sooner prn.  To PCP for other health maintenance.      Amenorrhea due to Depo Provera    Patient will call with next menses for Depo Provera injection. Order with be placed at that time, pending negative UPT    Encounter for management and injection of depo-Provera    Screen for STD (sexually transmitted disease)  -     C. trachomatis/N. gonorrhoeae by AMP DNA [facility]; Vagina      Follow up in about 1 year (around 12/7/2024) for annual exam.

## 2023-12-11 LAB
C TRACH DNA SPEC QL NAA+PROBE: NOT DETECTED
N GONORRHOEA DNA SPEC QL NAA+PROBE: NOT DETECTED

## 2023-12-12 ENCOUNTER — TELEPHONE (OUTPATIENT)
Dept: OBSTETRICS AND GYNECOLOGY | Facility: CLINIC | Age: 28
End: 2023-12-12
Payer: MEDICAID

## 2023-12-12 DIAGNOSIS — B37.31 YEAST VAGINITIS: Primary | ICD-10-CM

## 2023-12-12 RX ORDER — FLUCONAZOLE 150 MG/1
150 TABLET ORAL
Qty: 2 TABLET | Refills: 0 | Status: SHIPPED | OUTPATIENT
Start: 2023-12-12 | End: 2023-12-13

## 2023-12-12 NOTE — TELEPHONE ENCOUNTER
----- Message from Liliana Lucero LPN sent at 12/12/2023 11:27 AM CST -----  Good Morning!     Pt called stating that she was waiting for Diflucan rx to be called into pharmacy     Please Advise   Talisha   ----- Message -----  From: Norah Cerda  Sent: 12/12/2023  11:04 AM CST  To: Tessie Dailey Staff     states the medication for diflucan was not called in to her pharm and calld in to her pharm and pt can be reached at  [phone]/aida/[facility] on [address]

## 2023-12-13 ENCOUNTER — PATIENT MESSAGE (OUTPATIENT)
Dept: OBSTETRICS AND GYNECOLOGY | Facility: CLINIC | Age: 28
End: 2023-12-13
Payer: MEDICAID

## 2023-12-13 DIAGNOSIS — B37.31 YEAST VAGINITIS: Primary | ICD-10-CM

## 2023-12-13 LAB
HPV HR 12 DNA SPEC QL NAA+PROBE: NEGATIVE
HPV16 AG SPEC QL: NEGATIVE
HPV18 DNA SPEC QL NAA+PROBE: NEGATIVE

## 2023-12-13 RX ORDER — FLUCONAZOLE 150 MG/1
150 TABLET ORAL
Qty: 2 TABLET | Refills: 0 | Status: SHIPPED | OUTPATIENT
Start: 2023-12-13 | End: 2023-12-17

## 2023-12-22 LAB
FINAL PATHOLOGIC DIAGNOSIS: NORMAL
Lab: NORMAL

## 2025-03-03 ENCOUNTER — OFFICE VISIT (OUTPATIENT)
Dept: OBSTETRICS AND GYNECOLOGY | Facility: CLINIC | Age: 30
End: 2025-03-03
Payer: MEDICAID

## 2025-03-03 VITALS
SYSTOLIC BLOOD PRESSURE: 114 MMHG | DIASTOLIC BLOOD PRESSURE: 76 MMHG | WEIGHT: 152.31 LBS | HEIGHT: 63 IN | BODY MASS INDEX: 26.99 KG/M2

## 2025-03-03 DIAGNOSIS — Z01.419 ENCOUNTER FOR ANNUAL ROUTINE GYNECOLOGICAL EXAMINATION: Primary | ICD-10-CM

## 2025-03-03 DIAGNOSIS — A59.01 TRICHOMONAL VAGINITIS: ICD-10-CM

## 2025-03-03 PROCEDURE — 3008F BODY MASS INDEX DOCD: CPT | Mod: CPTII,,,

## 2025-03-03 PROCEDURE — 3074F SYST BP LT 130 MM HG: CPT | Mod: CPTII,,,

## 2025-03-03 PROCEDURE — 3078F DIAST BP <80 MM HG: CPT | Mod: CPTII,,,

## 2025-03-03 PROCEDURE — 1160F RVW MEDS BY RX/DR IN RCRD: CPT | Mod: CPTII,,,

## 2025-03-03 PROCEDURE — 99999 PR PBB SHADOW E&M-EST. PATIENT-LVL III: CPT | Mod: PBBFAC,,,

## 2025-03-03 PROCEDURE — 1159F MED LIST DOCD IN RCRD: CPT | Mod: CPTII,,,

## 2025-03-03 PROCEDURE — 99213 OFFICE O/P EST LOW 20 MIN: CPT | Mod: PBBFAC

## 2025-03-03 PROCEDURE — 99395 PREV VISIT EST AGE 18-39: CPT | Mod: S$PBB,,,

## 2025-03-03 RX ORDER — METRONIDAZOLE 500 MG/1
500 TABLET ORAL
COMMUNITY
Start: 2025-02-24 | End: 2025-03-03

## 2025-03-03 NOTE — PROGRESS NOTES
Subjective:       Patient ID: Nena Nascimento is a 29 y.o. female.    Chief Complaint:  Annual Exam      History of Present Illness  HPI  Annual Exam-Premenopausal  Patient presents for annual exam. The patient has no complaints today. The patient is sexually active. GYN screening history: last pap: approximate date 23 and was normal. The patient wears seatbelts: yes. The patient participates in regular exercise: yes. Has the patient ever been transfused or tattooed?: yes. Menses are regular with periods lasting 5-6 days.  Denies excessive bleeding or cramping.    Patient diagnosed with Trich last week, currently on Flagyl for treatment         GYN & OB History  Patient's last menstrual period was 2025.   Date of Last Pap: 2023    OB History    Para Term  AB Living   5 3 2 1 2 3   SAB IAB Ectopic Multiple Live Births   1 0 0 0 3      # Outcome Date GA Lbr Genaro/2nd Weight Sex Type Anes PTL Lv   5  19 36w3d  2.35 kg (5 lb 2.9 oz) F CS-LTranv Spinal Y ALICIA      Complications: Intraamniotic Infection   4 Term 03/26/15 39w0d  3.487 kg (7 lb 11 oz) M CS-Unspec   ALICIA   3 Term 11/05/10 40w0d  2.948 kg (6 lb 8 oz) F CS-Unspec   ALICIA   2 AB            1 SAB                Review of Systems  Review of Systems   Constitutional: Negative.    HENT: Negative.     Eyes: Negative.    Respiratory: Negative.     Cardiovascular: Negative.    Gastrointestinal: Negative.    Genitourinary: Negative.    Musculoskeletal: Negative.    Integumentary:  Negative.   Neurological: Negative.    Hematological: Negative.    Psychiatric/Behavioral: Negative.     All other systems reviewed and are negative.  Breast: negative.            Objective:      Physical Exam:   Constitutional: She is oriented to person, place, and time. She appears well-developed and well-nourished.    HENT:   Head: Normocephalic and atraumatic.   Nose: Nose normal.    Eyes: Pupils are equal, round, and reactive to light. Conjunctivae  and EOM are normal.     Cardiovascular:  Normal rate and regular rhythm.             Pulmonary/Chest: Effort normal. She has no decreased breath sounds. She has no rhonchi. Right breast exhibits no inverted nipple, no mass, no nipple discharge, no tenderness and no bleeding. Left breast exhibits no inverted nipple, no mass, no nipple discharge, no tenderness and no bleeding. Breasts are symmetrical.        Abdominal: Soft. There is no abdominal tenderness.     Genitourinary:    Inguinal canal, vagina, uterus, right adnexa and left adnexa normal.      Pelvic exam was performed with patient supine.   The external female genitalia was normal.   No external genitalia lesions identified,Genitalia hair distrobution normal .     Labial bartholins normal.Cervix is normal. Right adnexum displays no mass and no tenderness. Left adnexum displays no mass and no tenderness. No vaginal discharge, tenderness or bleeding in the vagina. Vagina was moist.Cervix exhibits no motion tenderness, no discharge and no tenderness.    pap smear not completedUerus contour normal  Uterus is not tender. Uterus size: 8 cm.Normal urethral meatus.          Musculoskeletal: Normal range of motion and moves all extremeties.       Neurological: She is alert and oriented to person, place, and time.    Skin: Skin is warm and dry.    Psychiatric: She has a normal mood and affect. Her speech is normal and behavior is normal. Mood, judgment and thought content normal.             Assessment:        1. Encounter for annual routine gynecological examination    2. Trichomonal vaginitis               Plan:   Continue annual well woman exam.  Pap current, due 12/2026. Patient was counseled today on ASCCP screening guidelines (pap smear every 3 years in low risk patients) and recommendations for annual pelvic exams and clinical breast exams, yearly mammograms starting at age 40; and to see her PCP for other healthcare maintenance.         Diagnosis and orders  this visit:  Encounter for annual routine gynecological examination    Trichomonal vaginitis   - Complete Flagyl, RTC in 6 weeks for PAULA       Karma Kaminski, NP

## (undated) DEVICE — DRESSING AQUACEL AG ADV 3.5X12